# Patient Record
Sex: MALE | Race: WHITE | NOT HISPANIC OR LATINO | Employment: FULL TIME | URBAN - METROPOLITAN AREA
[De-identification: names, ages, dates, MRNs, and addresses within clinical notes are randomized per-mention and may not be internally consistent; named-entity substitution may affect disease eponyms.]

---

## 2020-02-14 NOTE — PROGRESS NOTES
"SUBJECTIVE:   CC: Armaan Agustin is an 54 year old male who presents for preventative health visit.     Healthy Habits:     Getting at least 3 servings of Calcium per day:  NO    Bi-annual eye exam:  Yes    Dental care twice a year:  Yes    Sleep apnea or symptoms of sleep apnea:  None    Diet:  Regular (no restrictions)    Frequency of exercise:  6-7 days/week    Duration of exercise:  15-30 minutes    Taking medications regularly:  Yes    Medication side effects:  None    PHQ-2 Total Score: 0    Additional concerns today:  No    Patient does have additional concerns including reflux which is improved with Tums.  States he was previously given a referral for GI.  Symptoms have improved over the last couple years after starting to use Tums.  Occasionally feels like there is something caught in his throat, location not very concerned at this time.  Does describe some worsening of pain after caffeinated beverages such as Coke or Pepsi.    Additionally patient would like a skin tag removed in his left groin for cosmetic purposes.  He refers to it as his \"third testicle \".    Patient does report having a normal colonoscopy approximately 5 years ago.  I have recommended he bring in records for us to review or call and let us know where this was done so we can obtain records.    He is not fasting today.    Today's PHQ-2 Score:   PHQ-2 ( 1999 [employer]) 2/20/2020   Q1: Little interest or pleasure in doing things 0   Q2: Feeling down, depressed or hopeless 0   PHQ-2 Score 0   Q1: Little interest or pleasure in doing things Not at all   Q2: Feeling down, depressed or hopeless Not at all   PHQ-2 Score 0     Abuse: Current or Past(Physical, Sexual or Emotional)- No  Do you feel safe in your environment? Yes    Social History     Tobacco Use     Smoking status: Never Smoker     Smokeless tobacco: Never Used   Substance Use Topics     Alcohol use: Yes     Comment: 3 drinks per week     If you drink alcohol do you typically have " >3 drinks per day or >7 drinks per week? No    Alcohol Use 2/20/2020   Prescreen: >3 drinks/day or >7 drinks/week? No   Prescreen: >3 drinks/day or >7 drinks/week? -     Last PSA: No results found for: PSA - Declined screening after informed decision making.    Reviewed orders with patient. Reviewed health maintenance and updated orders accordingly - Yes    There is no problem list on file for this patient.    Past Surgical History:   Procedure Laterality Date     C RECONSTRUCT SCAPHOID CARPAL W PROSTHESIS         Social History     Tobacco Use     Smoking status: Never Smoker     Smokeless tobacco: Never Used   Substance Use Topics     Alcohol use: Yes     Comment: 3 drinks per week     Family History   Problem Relation Age of Onset     Cancer Father          No current outpatient medications on file.     No Known Allergies    Reviewed and updated as needed this visit by clinical staff  Tobacco  Allergies  Meds  Med Hx  Surg Hx  Fam Hx  Soc Hx      Reviewed and updated as needed this visit by Provider  Tobacco  Allergies  Med Hx  Surg Hx  Fam Hx  Soc Hx       History reviewed. No pertinent past medical history.   Past Surgical History:   Procedure Laterality Date     C RECONSTRUCT SCAPHOID CARPAL W PROSTHESIS       Review of Systems   Constitutional: Negative for chills and fever.   HENT: Negative for congestion, ear pain, hearing loss and sore throat.    Eyes: Positive for visual disturbance. Negative for pain.   Respiratory: Negative for cough and shortness of breath.    Cardiovascular: Negative for chest pain, palpitations and peripheral edema.   Gastrointestinal: Negative for abdominal pain, constipation, diarrhea, heartburn, hematochezia and nausea.   Genitourinary: Negative for discharge, dysuria, frequency, genital sores, hematuria, impotence and urgency.   Musculoskeletal: Negative for arthralgias, joint swelling and myalgias.   Skin: Negative for rash.   Neurological: Negative for dizziness,  "weakness, headaches and paresthesias.   Psychiatric/Behavioral: Negative for mood changes. The patient is not nervous/anxious.      OBJECTIVE:   /80   Pulse 70   Temp 98.5  F (36.9  C) (Temporal)   Resp 20   Ht 1.683 m (5' 6.25\")   Wt 74.4 kg (164 lb)   SpO2 96%   BMI 26.27 kg/m      Physical Exam  GENERAL: healthy, alert and no distress  EYES: Eyes grossly normal to inspection, PERRL and conjunctivae and sclerae normal  HENT: ear canals and TM's normal, nose and mouth without ulcers or lesions  NECK: no adenopathy, no asymmetry, masses, or scars and thyroid normal to palpation  RESP: lungs clear to auscultation - no rales, rhonchi or wheezes  CV: regular rate and rhythm, normal S1 S2, no S3 or S4, no murmur, click or rub, no peripheral edema and peripheral pulses strong  ABDOMEN: soft, nontender, no hepatosplenomegaly, no masses and bowel sounds normal   (male): Large skin tag, approximately 3 cm in diameter. Appearance in the left inguinal region.  Otherwise normal male genitalia without lesions or urethral discharge, no hernia  RECTAL: normal sphincter tone, no rectal masses, prostate normal size, smooth, nontender without nodules or masses  MS: no gross musculoskeletal defects noted, no edema  SKIN: no suspicious lesions or rashes  NEURO: Normal strength and tone, mentation intact and speech normal  PSYCH: mentation appears normal, affect normal/bright    Diagnostic Test Results:  Labs pending, see orders.     ASSESSMENT/PLAN:   1. Routine general medical examination at a health care facility: General screenings done.  Patient declines prostate cancer screening at this time.  States he previously had a colonoscopy done approximately 5 years ago and now will await him to find out what facility this was done out so we can obtain records.  States it was normal which would put him at 10-year follow-up but require records for verification.  Discussed helmet use, seatbelt use, lifejacket use, sunscreen " "use.  Check general screening labs as below.    COUNSELING:   Reviewed preventive health counseling, as reflected in patient instructions       Regular exercise       Healthy diet/nutrition       Vision screening       Hearing screening       Immunizations    Declined: Influenza and Zoster due to Conscientious objector           Alcohol Use       HIV screeninx in teen years, 1x in adult years, and at intervals if high risk       Colon cancer screening       Prostate cancer screening    2. Special screening for malignant neoplasms, colon: Patient will get his records or call with facility name for us to obtain records for his colonoscopy.    3. Screening for thyroid disorder:   - TSH with free T4 reflex; Future    4. Screening cholesterol level: Patient not fasting today.  Will return for fasting labs.  - Lipid panel reflex to direct LDL Fasting; Future    5. Screening for diabetes mellitus: Patient not fasting today.  Will return for fasting labs.  - GLUCOSE; Future    6. Skin tag: Given size and desire for good cosmetic appearance, recommend removal by general surgery or dermatology.  Referral placed.  - GENERAL SURG ADULT REFERRAL; Future    7. Overweight (BMI 25.0-29.9):  Estimated body mass index is 26.27 kg/m  as calculated from the following:    Height as of this encounter: 1.683 m (5' 6.25\").    Weight as of this encounter: 74.4 kg (164 lb).     Weight management plan: Discussed healthy diet and exercise guidelines    8. Gastroesophageal reflux disease, esophagitis presence not specified: Recommend conservative therapy as symptoms are days 2 weeks apart.  He can use Pepcid as needed.  Avoid caffeinated beverages, Pasta sauce, fatty foods, mint, alcohol, chocolate, etc.       reports that he has never smoked. He has never used smokeless tobacco.      Counseling Resources:  ATP IV Guidelines  Pooled Cohorts Equation Calculator  FRAX Risk Assessment  ICSI Preventive Guidelines  Dietary Guidelines for " Americans, 2010  USDA's MyPlate  ASA Prophylaxis  Lung CA Screening    Newton Hopson MD  [facility]     This chart is completed utilizing dictation software; typos and/or incorrect word substitutions may unintentionally occur.

## 2020-02-20 ENCOUNTER — OFFICE VISIT (OUTPATIENT)
Dept: FAMILY MEDICINE | Facility: CLINIC | Age: 55
End: 2020-02-20
Payer: COMMERCIAL

## 2020-02-20 ENCOUNTER — TELEPHONE (OUTPATIENT)
Dept: SURGERY | Facility: CLINIC | Age: 55
End: 2020-02-20

## 2020-02-20 VITALS
BODY MASS INDEX: 26.36 KG/M2 | SYSTOLIC BLOOD PRESSURE: 110 MMHG | HEIGHT: 66 IN | WEIGHT: 164 LBS | HEART RATE: 70 BPM | RESPIRATION RATE: 20 BRPM | OXYGEN SATURATION: 96 % | TEMPERATURE: 98.5 F | DIASTOLIC BLOOD PRESSURE: 80 MMHG

## 2020-02-20 DIAGNOSIS — Z12.11 SPECIAL SCREENING FOR MALIGNANT NEOPLASMS, COLON: ICD-10-CM

## 2020-02-20 DIAGNOSIS — Z13.29 SCREENING FOR THYROID DISORDER: ICD-10-CM

## 2020-02-20 DIAGNOSIS — Z00.00 ROUTINE GENERAL MEDICAL EXAMINATION AT A HEALTH CARE FACILITY: Primary | ICD-10-CM

## 2020-02-20 DIAGNOSIS — Z13.220 SCREENING CHOLESTEROL LEVEL: ICD-10-CM

## 2020-02-20 DIAGNOSIS — L91.8 SKIN TAG: ICD-10-CM

## 2020-02-20 DIAGNOSIS — E66.3 OVERWEIGHT (BMI 25.0-29.9): ICD-10-CM

## 2020-02-20 DIAGNOSIS — Z13.1 SCREENING FOR DIABETES MELLITUS: ICD-10-CM

## 2020-02-20 DIAGNOSIS — K21.9 GASTROESOPHAGEAL REFLUX DISEASE, ESOPHAGITIS PRESENCE NOT SPECIFIED: ICD-10-CM

## 2020-02-20 PROCEDURE — 99213 OFFICE O/P EST LOW 20 MIN: CPT | Mod: 25 | Performed by: FAMILY MEDICINE

## 2020-02-20 PROCEDURE — 99386 PREV VISIT NEW AGE 40-64: CPT | Performed by: FAMILY MEDICINE

## 2020-02-20 ASSESSMENT — ENCOUNTER SYMPTOMS
SHORTNESS OF BREATH: 0
NERVOUS/ANXIOUS: 0
HEMATOCHEZIA: 0
EYE PAIN: 0
SORE THROAT: 0
HEMATURIA: 0
CONSTIPATION: 0
WEAKNESS: 0
HEARTBURN: 0
COUGH: 0
HEADACHES: 0
ARTHRALGIAS: 0
MYALGIAS: 0
NAUSEA: 0
JOINT SWELLING: 0
DYSURIA: 0
FEVER: 0
ABDOMINAL PAIN: 0
DIARRHEA: 0
FREQUENCY: 0
PALPITATIONS: 0
CHILLS: 0
PARESTHESIAS: 0
DIZZINESS: 0

## 2020-02-20 ASSESSMENT — MIFFLIN-ST. JEOR: SCORE: 1535.62

## 2020-02-20 ASSESSMENT — PAIN SCALES - GENERAL: PAINLEVEL: NO PAIN (0)

## 2020-02-20 NOTE — TELEPHONE ENCOUNTER
[facility] Call Center    Phone Message    May a detailed message be left on voicemail: yes     Reason for Call: Other: patient has an order to see general surgery for Skin Tag, referring provider clinic would like to know if provider would see pt for dx of Skin Tag. please advise     Action Taken: Message routed to:  Adult Clinics: Surgery, General p [phone]

## 2020-02-21 NOTE — TELEPHONE ENCOUNTER
"Pt called, to gather more information. No answer. VM id's pt. Left message for pt to call the clinic back at [phone]....Kristin Triana RN        Skin tag: Given size and desire for good cosmetic appearance, recommend removal by general surgery or dermatology.  Referral placed.  - GENERAL SURG ADULT REFERRAL; Future     7. Overweight (BMI 25.0-29.9):  Estimated body mass index is 26.27 kg/m  as calculated from the following:    Height as of this encounter: 1.683 m (5' 6.25\").    Weight as of this encounter: 74.4 kg (164 lb).     Weight management plan: Discussed healthy diet and exercise guidelines     8. Gastroesophageal reflux disease, esophagitis presence not specified: Recommend conservative therapy as symptoms are days 2 weeks apart.  He can use Pepcid as needed.  Avoid caffeinated beverages, Pasta sauce, fatty foods, mint, alcohol, chocolate, etc.         reports that he has never smoked. He has never used smokeless tobacco.        Counseling Resources:  ATP IV Guidelines  Pooled Cohorts Equation Calculator  FRAX Risk Assessment  ICSI Preventive Guidelines  Dietary Guidelines for Americans, 2010  USDA's MyPlate  ASA Prophylaxis  Lung CA Screening     Newton Hopson MD  [facility]      This chart is completed utilizing dictation software; typos and/or incorrect word substitutions may unintentionally occur.        Other Notes     "

## 2020-02-24 NOTE — TELEPHONE ENCOUNTER
Called patient and informed him that the general surgeon that we have here in [place] does not deal with skin tags.  I informed him that is a Dermatology issue.  I told him to get in contact with the person that referred him and have him put in a dermatology referral.  I scheduled him with Dr. Hardin on 3/13/2020.    Glenys Teran CMA

## 2020-07-06 ENCOUNTER — TELEPHONE (OUTPATIENT)
Dept: FAMILY MEDICINE | Facility: CLINIC | Age: 55
End: 2020-07-06

## 2020-07-06 DIAGNOSIS — R22.9 SUBCUTANEOUS MASS: Primary | ICD-10-CM

## 2020-07-06 NOTE — TELEPHONE ENCOUNTER
"Reason for Call: Request for an order or referral:    Order or referral being requested: General Surgery Referral (Possibly Dermatology Referral)    Date needed: as soon as possible    Has the patient been seen by the PCP for this problem? YES    Additional comments: Patient was seen 2/20/20 with Dr Davion Garcia for a large skin tag. Patient was referred to general surgery for removal. This note was placed by the [facility]: \"Called patient and informed him that the general surgeon that we have here in [place] does not deal with skin tags.  I informed him that is a Dermatology issue.  I told him to get in contact with the person that referred him and have him put in a dermatology referral.  I scheduled him with Dr. Hardin on 3/13/2020.  Glenys Tearn CMA\" . Patient's wife called in (no C2C on file) and requested another referral be placed or that the doctor reach out to the surgery clinic and explain the situation because now there is a lump in the patient's leg under the skin tag and they do not believe Dermatology can take care of the lump and skin tag. Please advise. Patient works 2nd shift so it would be best to reach out to him between 10am and 1pm. [phone]    Phone number Patient can be reached at:  Home number on file [phone] (home)    Best Time:  10am -1pm     Can we leave a detailed message on this number?  YES    Call taken on 7/6/2020 at 2:29 PM by Crista Anderson    "

## 2020-07-07 NOTE — TELEPHONE ENCOUNTER
Called patient. No answer. Left message. Will try back later.    Newton Hopson MD  [facility]

## 2020-07-08 NOTE — TELEPHONE ENCOUNTER
Called patient again. Got voicemail which is full and can not leave message.    I will try again later. A surgical referral has been placed. Please call and help schedule. If they have further questions I can reach out again.    Newton Hopson MD  [facility]

## 2020-07-08 NOTE — TELEPHONE ENCOUNTER
Pt called back relayed message and currently no questions at this time and he is currently getting it scheduled thank you

## 2020-09-02 ENCOUNTER — OFFICE VISIT (OUTPATIENT)
Dept: SURGERY | Facility: CLINIC | Age: 55
End: 2020-09-02
Attending: FAMILY MEDICINE
Payer: COMMERCIAL

## 2020-09-02 VITALS
SYSTOLIC BLOOD PRESSURE: 133 MMHG | BODY MASS INDEX: 26.27 KG/M2 | WEIGHT: 164 LBS | HEART RATE: 71 BPM | DIASTOLIC BLOOD PRESSURE: 85 MMHG

## 2020-09-02 DIAGNOSIS — R22.9 SUBCUTANEOUS MASS: ICD-10-CM

## 2020-09-02 PROCEDURE — 99243 OFF/OP CNSLTJ NEW/EST LOW 30: CPT | Performed by: SURGERY

## 2020-09-02 NOTE — LETTER
9/2/2020         RE: Armaan Agustin  [id number] [address] [address]        Dear Colleague,    Thank you for referring your patient, Armaan Agustin, to the [facility]. Please see a copy of my visit note below.    Patient seen in consultation for large skin tag by Newton Hopson    HPI:  Patient is a 55 year old male  with complaints of enlarging skin tag left groin  The patient noticed the symptoms about 2-3 years ago.    Started small and now has gotten to be about the size of a grape  There might be a lump below the skin in the area also  No painful, no bleeding  nothing makes the episode better.  Patient has not family history of skin cancer problems. Father had colon cancer    Review Of Systems    Skin: as above. Has some moles that he wouldn't mind having removed  Ears/Nose/Throat: negative  Respiratory: No shortness of breath, dyspnea on exertion, cough, or hemoptysis  Cardiovascular: negative  Gastrointestinal: negative  Genitourinary: negative  Musculoskeletal: negative  Neurologic: negative  Hematologic/Lymphatic/Immunologic: negative  Endocrine: negative      Past Medical History:   Diagnosis Date     NO ACTIVE PROBLEMS        Past Surgical History:   Procedure Laterality Date     C RECONSTRUCT SCAPHOID CARPAL W PROSTHESIS Right      FINGER SURGERY Left 1982    index       Social History     Socioeconomic History     Marital status:      Spouse name: Not on file     Number of children: Not on file     Years of education: Not on file     Highest education level: Not on file   Occupational History     Not on file   Social Needs     Financial resource strain: Not on file     Food insecurity     Worry: Not on file     Inability: Not on file     Transportation needs     Medical: Not on file     Non-medical: Not on file   Tobacco Use     Smoking status: Never Smoker     Smokeless tobacco: Never Used   Substance and Sexual Activity     Alcohol use: Yes     Comment: 3  drinks per week     Drug use: Never     Sexual activity: Yes     Partners: Female   Lifestyle     Physical activity     Days per week: Not on file     Minutes per session: Not on file     Stress: Not on file   Relationships     Social connections     Talks on phone: Not on file     Gets together: Not on file     Attends Holiness service: Not on file     Active member of club or organization: Not on file     Attends meetings of clubs or organizations: Not on file     Relationship status: Not on file     Intimate partner violence     Fear of current or ex partner: Not on file     Emotionally abused: Not on file     Physically abused: Not on file     Forced sexual activity: Not on file   Other Topics Concern     Not on file   Social History Narrative     Not on file       No current outpatient medications on file.       Medications and history reviewed    Physical exam:  Vitals: /85   Pulse 71   Wt 74.4 kg (164 lb)   BMI 26.27 kg/m    BMI= Body mass index is 26.27 kg/m .    Constitutional: healthy, alert and no distress  Head: Normocephalic. No masses, lesions, tenderness or abnormalities  Cardiovascular: negative, PMI normal. No lifts, heaves, or thrills. RRR. No murmurs, clicks gallops or rub  Respiratory: negative, Percussion normal. Good diaphragmatic excursion. Lungs clear  Gastrointestinal: Abdomen soft, non-tender. BS normal. No masses, organomegaly  : Normal external genitalia without lesions and see skin below  Musculoskeletal: extremities normal- no gross deformities noted, gait normal and normal muscle tone  Skin: left groin with large pedicled/polypoid skin tag about 2.5 cm diameter, stalk itself perhaps a cm or less. No other skin abnormalities, no ulceration or bleeding. Not tender.  There are a few moles he had me look at 1 also in left groin area and a few on his back. These all appear as regular nevi no concerning features.  Psychiatric: mentation appears normal and affect  normal/bright  Patient able to get up on table without difficulty.      Assessment:     ICD-10-CM    1. Subcutaneous mass  R22.9 GENERAL SURG ADULT REFERRAL     Plan: With this being on a stalk as it is should be able to be resected with a fairly small incision.  We will plan to do this in clinic under local.  Patient agreeable.  We will work at scheduling a time for the procedure    Contreras Hernández MD      Again, thank you for allowing me to participate in the care of your patient.        Sincerely,        Contreras Hernández MD

## 2020-09-02 NOTE — PROGRESS NOTES
Patient seen in consultation for large skin tag by Newton Hopson    HPI:  Patient is a 55 year old male  with complaints of enlarging skin tag left groin  The patient noticed the symptoms about 2-3 years ago.    Started small and now has gotten to be about the size of a grape  There might be a lump below the skin in the area also  No painful, no bleeding  nothing makes the episode better.  Patient has not family history of skin cancer problems. Father had colon cancer    Review Of Systems    Skin: as above. Has some moles that he wouldn't mind having removed  Ears/Nose/Throat: negative  Respiratory: No shortness of breath, dyspnea on exertion, cough, or hemoptysis  Cardiovascular: negative  Gastrointestinal: negative  Genitourinary: negative  Musculoskeletal: negative  Neurologic: negative  Hematologic/Lymphatic/Immunologic: negative  Endocrine: negative      Past Medical History:   Diagnosis Date     NO ACTIVE PROBLEMS        Past Surgical History:   Procedure Laterality Date     C RECONSTRUCT SCAPHOID CARPAL W PROSTHESIS Right      FINGER SURGERY Left 1982    index       Social History     Socioeconomic History     Marital status:      Spouse name: Not on file     Number of children: Not on file     Years of education: Not on file     Highest education level: Not on file   Occupational History     Not on file   Social Needs     Financial resource strain: Not on file     Food insecurity     Worry: Not on file     Inability: Not on file     Transportation needs     Medical: Not on file     Non-medical: Not on file   Tobacco Use     Smoking status: Never Smoker     Smokeless tobacco: Never Used   Substance and Sexual Activity     Alcohol use: Yes     Comment: 3 drinks per week     Drug use: Never     Sexual activity: Yes     Partners: Female   Lifestyle     Physical activity     Days per week: Not on file     Minutes per session: Not on file     Stress: Not on file   Relationships     Social connections      Talks on phone: Not on file     Gets together: Not on file     Attends Hoahaoism service: Not on file     Active member of club or organization: Not on file     Attends meetings of clubs or organizations: Not on file     Relationship status: Not on file     Intimate partner violence     Fear of current or ex partner: Not on file     Emotionally abused: Not on file     Physically abused: Not on file     Forced sexual activity: Not on file   Other Topics Concern     Not on file   Social History Narrative     Not on file       No current outpatient medications on file.       Medications and history reviewed    Physical exam:  Vitals: /85   Pulse 71   Wt 74.4 kg (164 lb)   BMI 26.27 kg/m    BMI= Body mass index is 26.27 kg/m .    Constitutional: healthy, alert and no distress  Head: Normocephalic. No masses, lesions, tenderness or abnormalities  Cardiovascular: negative, PMI normal. No lifts, heaves, or thrills. RRR. No murmurs, clicks gallops or rub  Respiratory: negative, Percussion normal. Good diaphragmatic excursion. Lungs clear  Gastrointestinal: Abdomen soft, non-tender. BS normal. No masses, organomegaly  : Normal external genitalia without lesions and see skin below  Musculoskeletal: extremities normal- no gross deformities noted, gait normal and normal muscle tone  Skin: left groin with large pedicled/polypoid skin tag about 2.5 cm diameter, stalk itself perhaps a cm or less. No other skin abnormalities, no ulceration or bleeding. Not tender.  There are a few moles he had me look at 1 also in left groin area and a few on his back. These all appear as regular nevi no concerning features.  Psychiatric: mentation appears normal and affect normal/bright  Patient able to get up on table without difficulty.      Assessment:     ICD-10-CM    1. Subcutaneous mass  R22.9 GENERAL SURG ADULT REFERRAL     Plan: With this being on a stalk as it is should be able to be resected with a fairly small incision.   We will plan to do this in clinic under local.  Patient agreeable.  We will work at scheduling a time for the procedure    Contreras Hernández MD

## 2020-09-16 ENCOUNTER — OFFICE VISIT (OUTPATIENT)
Dept: SURGERY | Facility: CLINIC | Age: 55
End: 2020-09-16
Payer: COMMERCIAL

## 2020-09-16 VITALS
DIASTOLIC BLOOD PRESSURE: 83 MMHG | WEIGHT: 164 LBS | SYSTOLIC BLOOD PRESSURE: 133 MMHG | BODY MASS INDEX: 26.36 KG/M2 | HEART RATE: 78 BPM | HEIGHT: 66 IN

## 2020-09-16 DIAGNOSIS — L91.8 SKIN TAG: ICD-10-CM

## 2020-09-16 DIAGNOSIS — L98.8 POLYP OF SKIN: Primary | ICD-10-CM

## 2020-09-16 PROCEDURE — 11403 EXC TR-EXT B9+MARG 2.1-3CM: CPT | Mod: 59 | Performed by: SURGERY

## 2020-09-16 PROCEDURE — 11200 RMVL SKIN TAGS UP TO&INC 15: CPT | Mod: 51 | Performed by: SURGERY

## 2020-09-16 PROCEDURE — 88305 TISSUE EXAM BY PATHOLOGIST: CPT | Performed by: SURGERY

## 2020-09-16 PROCEDURE — 88304 TISSUE EXAM BY PATHOLOGIST: CPT | Performed by: SURGERY

## 2020-09-16 ASSESSMENT — MIFFLIN-ST. JEOR: SCORE: 1526.65

## 2020-09-16 NOTE — LETTER
9/16/2020         RE: Armaan Agustin  [address] [address] [place] [id number]        Dear Colleague,    Thank you for referring your patient, Armaan Agustin, to the [facility]. Please see a copy of my visit note below.    PROCEDURE:   Written consent was obtained  The left groin area was prepped and draped. I first anesthetized the larger polypoid lesion with 1% lidocaine with epinephrine. This was just lateral to the scrotum but did not arise from scrotal skin.  I used a scalpel to amputate lesion at the base of the stalk.  This measured 3 x 2.3 cm on removal. Closure was accomplished with interrupted 4-0 vicryl for the dermal layer of the skin to help approximate the skin edges.  Incision was covered with skin glue.    I then anesthetized the smaller lesion which is more superior in the left groin and appeared as likely a skin tag.  I used forceps to elevate the lesion and then amputated it with the scalpel.  A single 4-0 Vicryl was used to help approximate the skin edges.  The wound was then dressed with skin glue.    The procedure was well tolerated and without complications. Specimen was sent to Pathology.        Again, thank you for allowing me to participate in the care of your patient.        Sincerely,        Contreras Hernández MD

## 2020-09-16 NOTE — PROGRESS NOTES
PROCEDURE:   Written consent was obtained  The left groin area was prepped and draped. I first anesthetized the larger polypoid lesion with 1% lidocaine with epinephrine. This was just lateral to the scrotum but did not arise from scrotal skin.  I used a scalpel to amputate lesion at the base of the stalk.  This measured 3 x 2.3 cm on removal. Closure was accomplished with interrupted 4-0 vicryl for the dermal layer of the skin to help approximate the skin edges.  Incision was covered with skin glue.    I then anesthetized the smaller lesion which is more superior in the left groin and appeared as likely a skin tag.  I used forceps to elevate the lesion and then amputated it with the scalpel.  A single 4-0 Vicryl was used to help approximate the skin edges.  The wound was then dressed with skin glue.    The procedure was well tolerated and without complications. Specimen was sent to Pathology.

## 2020-09-18 LAB — COPATH REPORT: NORMAL

## 2020-12-21 ENCOUNTER — OFFICE VISIT (OUTPATIENT)
Dept: FAMILY MEDICINE | Facility: CLINIC | Age: 55
End: 2020-12-21
Payer: COMMERCIAL

## 2020-12-21 VITALS
HEIGHT: 68 IN | BODY MASS INDEX: 24.4 KG/M2 | TEMPERATURE: 98.3 F | SYSTOLIC BLOOD PRESSURE: 122 MMHG | WEIGHT: 161 LBS | RESPIRATION RATE: 20 BRPM | DIASTOLIC BLOOD PRESSURE: 80 MMHG | HEART RATE: 88 BPM | OXYGEN SATURATION: 99 %

## 2020-12-21 DIAGNOSIS — Z48.02: Primary | ICD-10-CM

## 2020-12-21 DIAGNOSIS — S01.01XD LACERATION OF SCALP, SUBSEQUENT ENCOUNTER: ICD-10-CM

## 2020-12-21 PROCEDURE — 99213 OFFICE O/P EST LOW 20 MIN: CPT | Performed by: NURSE PRACTITIONER

## 2020-12-21 ASSESSMENT — MIFFLIN-ST. JEOR: SCORE: 1536.85

## 2020-12-21 NOTE — PATIENT INSTRUCTIONS
"  Patient Education     Stitches or Staple Removal  You were seen today for removal of your stitches or staples. Your wound is healing as expected. The wound has healed well enough that the stitches or staples can be removed. The wound will continue to heal for the next few months.   At this time there is no sign of infection.   Home care    If you have pain, take pain medicine as advised by your healthcare provider.     Keep your wound clean and protected by covering it with a bandage for the next week or so.     Wash your hands with soap and warm water before and after caring for your wound. This helps prevent infection.    Clean the wound gently with soap and clean, running water daily or as directed by your healthcare provider. Don't use iodine, alcohol, or other cleansers on the wound.  Gently pat it dry. Put on a new bandage, if needed. Don't reuse bandages.    If the area gets wet, gently pat it dry with a clean cloth. Replace the wet bandage with a dry one.    Check the wound daily for signs of infection. (These are listed under \"When to seek medical advice\" below.)    You may shower and bathe as usual. Swimming may be allowed, but check with your healthcare provider first.    Keep the wound out of prolonged direct sunlight. The new skin is very sensitive and can easily sunburn causing worse scarring.    Ask your provider about using over-the-counter scar removing creams if your wound is highly visible.    Follow-up care  Follow up with your healthcare provider as advised.  When to seek medical advice   Call your healthcare provider if any of the following occur:    Wound reopens or bleeds    Signs of an infection, such as:  ? Increasing redness or swelling around the wound  ? Increased warmth from the wound  ? Worsening pain  ? Red streaking lines away from the wound  ? Fluid draining from the wound    Fever of 100.4 F (38 C) or higher, or as directed by your healthcare provider  [employer] last reviewed this " educational content on 4/1/2018 2000-2020 [employer]. [address] [place] [address], [place] [address]. All rights reserved. This information is not intended as a substitute for professional medical care. Always follow your healthcare professional's instructions.

## 2020-12-21 NOTE — PROGRESS NOTES
"Subjective     Armaan Agustin is a 55 year old male who presents to clinic today for the following health issues:    HPI       Patient here for Staple Removal from right side of his head.  Patient states that he injured the top of his scalp due to blunt trauma (hitting on cabinet) he was seen in urgent care 10 days ago and had 5 staples placed.  Patient denies pain, erythema, and inflammation, or bruising.           Review of Systems   Constitutional, HEENT, cardiovascular, pulmonary, GI, , musculoskeletal, neuro, skin, endocrine and psych systems are negative, except as otherwise noted.      Objective    /80 (BP Location: Left arm, Patient Position: Chair, Cuff Size: Adult Regular)   Pulse 88   Temp 98.3  F (36.8  C) (Temporal)   Resp 20   Ht 1.715 m (5' 7.5\")   Wt 73 kg (161 lb)   SpO2 99%   BMI 24.84 kg/m    Body mass index is 24.84 kg/m .  Physical Exam   GENERAL: healthy, alert and no distress  SKIN: Laceration on top of scalp proximately 2 inches in length closed with 5 staples after obtaining an informed consent staples were removed successfully without incident mild area of bleeding occurred that stopped after pressure was held.  Neosporin was applied.      Assessment & Plan     Encounter for removal of staples      Laceration of scalp, subsequent encounter    Home care instructions were reviewed with the patient. The risks, benefits and treatment options of prescribed medications or other treatments have been discussed with the patient. The patient verbalized their understanding and should call or follow up if no improvement or if they develop further problems.            Patient Instructions     Patient Education     Stitches or Staple Removal  You were seen today for removal of your stitches or staples. Your wound is healing as expected. The wound has healed well enough that the stitches or staples can be removed. The wound will continue to heal for the next few months.   At this time " "there is no sign of infection.   Home care    If you have pain, take pain medicine as advised by your healthcare provider.     Keep your wound clean and protected by covering it with a bandage for the next week or so.     Wash your hands with soap and warm water before and after caring for your wound. This helps prevent infection.    Clean the wound gently with soap and clean, running water daily or as directed by your healthcare provider. Don't use iodine, alcohol, or other cleansers on the wound.  Gently pat it dry. Put on a new bandage, if needed. Don't reuse bandages.    If the area gets wet, gently pat it dry with a clean cloth. Replace the wet bandage with a dry one.    Check the wound daily for signs of infection. (These are listed under \"When to seek medical advice\" below.)    You may shower and bathe as usual. Swimming may be allowed, but check with your healthcare provider first.    Keep the wound out of prolonged direct sunlight. The new skin is very sensitive and can easily sunburn causing worse scarring.    Ask your provider about using over-the-counter scar removing creams if your wound is highly visible.    Follow-up care  Follow up with your healthcare provider as advised.  When to seek medical advice   Call your healthcare provider if any of the following occur:    Wound reopens or bleeds    Signs of an infection, such as:  ? Increasing redness or swelling around the wound  ? Increased warmth from the wound  ? Worsening pain  ? Red streaking lines away from the wound  ? Fluid draining from the wound    Fever of 100.4 F (38 C) or higher, or as directed by your healthcare provider  [employer] last reviewed this educational content on 4/1/2018 2000-2020 [employer]. [address] [place] [address]. All rights reserved. This information is not intended as a substitute for professional medical care. Always follow your healthcare professional's instructions.               No " follow-ups on file.    Melyssa Haas, MONIKA CNP  [facility]

## 2021-01-04 ENCOUNTER — HEALTH MAINTENANCE LETTER (OUTPATIENT)
Age: 56
End: 2021-01-04

## 2021-04-01 ENCOUNTER — TELEPHONE (OUTPATIENT)
Dept: FAMILY MEDICINE | Facility: CLINIC | Age: 56
End: 2021-04-01

## 2021-04-01 NOTE — TELEPHONE ENCOUNTER
Patient Quality Outreach Summary      Summary:    Patient is due/failing the following:   Colonoscopy and Physical     Type of outreach:    Phone, spoke to patient/parent. patient would like a call next week to schedule.     Questions for provider review:    None                                                                                                                    Manju Cat CMA       Chart routed to Care Team.

## 2021-04-25 ENCOUNTER — HEALTH MAINTENANCE LETTER (OUTPATIENT)
Age: 56
End: 2021-04-25

## 2021-04-27 NOTE — TELEPHONE ENCOUNTER
Spoke to patient and scheduled.     Next 5 appointments (look out 90 days)    Apr 30, 2021  8:00 AM  PHYSICAL with MONIKA Bowen CNP  [facility] ([facility] ) [address], [address]  [facility] [id number] [phone]          Manju Cat, [facility]

## 2021-10-10 ENCOUNTER — HEALTH MAINTENANCE LETTER (OUTPATIENT)
Age: 56
End: 2021-10-10

## 2022-05-22 ENCOUNTER — HEALTH MAINTENANCE LETTER (OUTPATIENT)
Age: 57
End: 2022-05-22

## 2022-09-18 ENCOUNTER — HEALTH MAINTENANCE LETTER (OUTPATIENT)
Age: 57
End: 2022-09-18

## 2022-11-01 ENCOUNTER — NURSE TRIAGE (OUTPATIENT)
Dept: PEDIATRICS | Facility: CLINIC | Age: 57
End: 2022-11-01

## 2022-11-01 NOTE — TELEPHONE ENCOUNTER
"S-(situation): Patient and patient's wife calling regarding difficulty breathing symptoms.     B-(background): Patient began cold symptoms last Wednesday, noticed wheezing/SOB this past Friday. Patient has taken home COVID tests, negative.     A-(assessment): Patient experiencing wheezing and SOB. Denies SOB at rest, states SOB has been constant since yesterday. Patient current 02 = 90%, HR = 76bpm. Earlier in phone conversation, 02 = 88%. Per spouse, breathing seems more labored with any physical exertion, \"walking from couch to kitchen, he gets out of breath\". Patient feels symptoms are getting progressively worse. Patient not sleeping, \"throat gets plugged up with mucus, unable to sleep\". No dizziness. Patient notes fatigue. Sometimes has runny nose. Cough, occasionally productive, light yellow phlegm.     R-(recommendations): Per protocol and 02 readings, RN advised ED. Patient verbalized understanding and agreed with plan.     Reason for Disposition    Oxygen level (e.g., pulse oximetry) 90 percent or lower    Additional Information    Negative: MODERATE difficulty breathing (e.g., speaks in phrases, SOB even at rest, pulse 100-120) of new-onset or worse than normal    Negative: Chest pain    Negative: Wheezing (high pitched whistling sound) and previous asthma attacks or use of asthma medicines    Negative: Difficulty breathing and within 14 days of COVID-19 Exposure    Negative: Difficulty breathing and only present when coughing    Negative: Difficulty breathing and only from stuffy nose    Negative: Difficulty breathing and only from stuffy nose or runny nose from common cold    Negative: SEVERE difficulty breathing (e.g., struggling for each breath, speaks in single words, pulse > 120)    Negative: Breathing stopped and hasn't returned    Negative: Choking on something    Negative: Bluish (or gray) lips or face    Negative: Difficult to awaken or acting confused (e.g., disoriented, slurred speech)    " "Negative: Passed out (i.e., fainted, collapsed and was not responding)    Negative: Wheezing started suddenly after medicine, an allergic food, or bee sting    Negative: Stridor    Negative: Slow, shallow and weak breathing    Negative: Sounds like a life-threatening emergency to the triager    Answer Assessment - Initial Assessment Questions  1. RESPIRATORY STATUS: \"Describe your breathing?\" (e.g., wheezing, shortness of breath, unable to speak, severe coughing)       SOB. Wheezing.   2. ONSET: \"When did this breathing problem begin?\"       started Friday   3. PATTERN \"Does the difficult breathing come and go, or has it been constant since it started?\"       Started to be constant yesterday.   4. SEVERITY: \"How bad is your breathing?\" (e.g., mild, moderate, severe)     - MILD: No SOB at rest, mild SOB with walking, speaks normally in sentences, can lie down, no retractions, pulse < 100.     - MODERATE: SOB at rest, SOB with minimal exertion and prefers to sit, cannot lie down flat, speaks in phrases, mild retractions, audible wheezing, pulse 100-120.     - SEVERE: Very SOB at rest, speaks in single words, struggling to breathe, sitting hunched forward, retractions, pulse > 120       Mild-moderate, does not feel SOB at rest, throat gets plugged up with mucus when laying down, unable to sleep.   5. RECURRENT SYMPTOM: \"Have you had difficulty breathing before?\" If Yes, ask: \"When was the last time?\" and \"What happened that time?\"       No   6. CARDIAC HISTORY: \"Do you have any history of heart disease?\" (e.g., heart attack, angina, bypass surgery, angioplasty)       no  7. LUNG HISTORY: \"Do you have any history of lung disease?\"  (e.g., pulmonary embolus, asthma, emphysema)      no  8. CAUSE: \"What do you think is causing the breathing problem?\"       Unsure   9. OTHER SYMPTOMS: \"Do you have any other symptoms? (e.g., dizziness, runny nose, cough, chest pain, fever)      No dizziness. Fatigue. Sometimes has runny " "nose. Cough, occasionally productive, light yellow phlegm.   10. O2 SATURATION MONITOR:  \"Do you use an oxygen saturation monitor (pulse oximeter) at home?\" If Yes, \"What is your reading (oxygen level) today?\" \"What is your usual oxygen saturation reading?\" (e.g., 95%)        88%   11. PREGNANCY: \"Is there any chance you are pregnant?\" \"When was your last menstrual period?\"        NA  12. TRAVEL: \"Have you traveled out of the country in the last month?\" (e.g., travel history, exposures)        No    Protocols used: BREATHING DIFFICULTY-A-OH    Melvin JOYCE RN 11/1/2022 at 8:22 AM    "

## 2022-11-09 ENCOUNTER — OFFICE VISIT (OUTPATIENT)
Dept: FAMILY MEDICINE | Facility: CLINIC | Age: 57
End: 2022-11-09
Payer: COMMERCIAL

## 2022-11-09 VITALS
SYSTOLIC BLOOD PRESSURE: 125 MMHG | OXYGEN SATURATION: 94 % | WEIGHT: 146.5 LBS | HEART RATE: 92 BPM | TEMPERATURE: 98.9 F | DIASTOLIC BLOOD PRESSURE: 86 MMHG | HEIGHT: 66 IN | BODY MASS INDEX: 23.54 KG/M2

## 2022-11-09 DIAGNOSIS — Z28.21 COVID-19 VACCINATION DECLINED: ICD-10-CM

## 2022-11-09 DIAGNOSIS — Z00.00 ROUTINE PHYSICAL EXAMINATION: Primary | ICD-10-CM

## 2022-11-09 DIAGNOSIS — Z87.891 PERSONAL HISTORY OF TOBACCO USE, PRESENTING HAZARDS TO HEALTH: ICD-10-CM

## 2022-11-09 DIAGNOSIS — Z09 HOSPITAL DISCHARGE FOLLOW-UP: ICD-10-CM

## 2022-11-09 DIAGNOSIS — Z11.3 SCREEN FOR STD (SEXUALLY TRANSMITTED DISEASE): ICD-10-CM

## 2022-11-09 DIAGNOSIS — Z12.11 SPECIAL SCREENING FOR MALIGNANT NEOPLASMS, COLON: ICD-10-CM

## 2022-11-09 DIAGNOSIS — Z13.1 SCREENING FOR DIABETES MELLITUS: ICD-10-CM

## 2022-11-09 DIAGNOSIS — Z13.220 LIPID SCREENING: ICD-10-CM

## 2022-11-09 DIAGNOSIS — R13.10 DYSPHAGIA, UNSPECIFIED TYPE: ICD-10-CM

## 2022-11-09 DIAGNOSIS — Z13.0 SCREENING, ANEMIA, DEFICIENCY, IRON: ICD-10-CM

## 2022-11-09 DIAGNOSIS — Z12.5 SCREENING FOR PROSTATE CANCER: ICD-10-CM

## 2022-11-09 DIAGNOSIS — Z28.21 INFLUENZA VACCINATION DECLINED: ICD-10-CM

## 2022-11-09 DIAGNOSIS — D72.829 LEUKOCYTOSIS, UNSPECIFIED TYPE: Primary | ICD-10-CM

## 2022-11-09 DIAGNOSIS — J96.01 ACUTE RESPIRATORY FAILURE WITH HYPOXIA (H): ICD-10-CM

## 2022-11-09 LAB
ALBUMIN SERPL-MCNC: 3.9 G/DL (ref 3.4–5)
ALP SERPL-CCNC: 53 U/L (ref 40–150)
ALT SERPL W P-5'-P-CCNC: 45 U/L (ref 0–70)
ANION GAP SERPL CALCULATED.3IONS-SCNC: 6 MMOL/L (ref 3–14)
AST SERPL W P-5'-P-CCNC: 22 U/L (ref 0–45)
BILIRUB SERPL-MCNC: 0.3 MG/DL (ref 0.2–1.3)
BUN SERPL-MCNC: 20 MG/DL (ref 7–30)
CALCIUM SERPL-MCNC: 9.5 MG/DL (ref 8.5–10.1)
CHLORIDE BLD-SCNC: 100 MMOL/L (ref 94–109)
CHOLEST SERPL-MCNC: 245 MG/DL
CO2 SERPL-SCNC: 31 MMOL/L (ref 20–32)
CREAT SERPL-MCNC: 0.8 MG/DL (ref 0.66–1.25)
ERYTHROCYTE [DISTWIDTH] IN BLOOD BY AUTOMATED COUNT: 11.8 % (ref 10–15)
FASTING STATUS PATIENT QL REPORTED: YES
GFR SERPL CREATININE-BSD FRML MDRD: >90 ML/MIN/1.73M2
GLUCOSE BLD-MCNC: 100 MG/DL (ref 70–99)
HCT VFR BLD AUTO: 46.5 % (ref 40–53)
HDLC SERPL-MCNC: 69 MG/DL
HGB BLD-MCNC: 15.6 G/DL (ref 13.3–17.7)
HIV 1+2 AB+HIV1 P24 AG SERPL QL IA: NONREACTIVE
LDLC SERPL CALC-MCNC: 143 MG/DL
MCH RBC QN AUTO: 32.2 PG (ref 26.5–33)
MCHC RBC AUTO-ENTMCNC: 33.5 G/DL (ref 31.5–36.5)
MCV RBC AUTO: 96 FL (ref 78–100)
NONHDLC SERPL-MCNC: 176 MG/DL
PLATELET # BLD AUTO: 324 10E3/UL (ref 150–450)
POTASSIUM BLD-SCNC: 4 MMOL/L (ref 3.4–5.3)
PROT SERPL-MCNC: 7.7 G/DL (ref 6.8–8.8)
PSA SERPL-MCNC: 0.6 UG/L (ref 0–4)
RBC # BLD AUTO: 4.85 10E6/UL (ref 4.4–5.9)
SODIUM SERPL-SCNC: 137 MMOL/L (ref 133–144)
T PALLIDUM AB SER QL: NONREACTIVE
TRIGL SERPL-MCNC: 166 MG/DL
WBC # BLD AUTO: 11.4 10E3/UL (ref 4–11)

## 2022-11-09 PROCEDURE — 87591 N.GONORRHOEAE DNA AMP PROB: CPT | Performed by: FAMILY MEDICINE

## 2022-11-09 PROCEDURE — 80053 COMPREHEN METABOLIC PANEL: CPT | Performed by: FAMILY MEDICINE

## 2022-11-09 PROCEDURE — G0103 PSA SCREENING: HCPCS | Performed by: FAMILY MEDICINE

## 2022-11-09 PROCEDURE — 99496 TRANSJ CARE MGMT HIGH F2F 7D: CPT | Performed by: FAMILY MEDICINE

## 2022-11-09 PROCEDURE — 80061 LIPID PANEL: CPT | Performed by: FAMILY MEDICINE

## 2022-11-09 PROCEDURE — 87389 HIV-1 AG W/HIV-1&-2 AB AG IA: CPT | Performed by: FAMILY MEDICINE

## 2022-11-09 PROCEDURE — 85025 COMPLETE CBC W/AUTO DIFF WBC: CPT | Performed by: FAMILY MEDICINE

## 2022-11-09 PROCEDURE — 99396 PREV VISIT EST AGE 40-64: CPT | Mod: 25 | Performed by: FAMILY MEDICINE

## 2022-11-09 PROCEDURE — 86780 TREPONEMA PALLIDUM: CPT | Performed by: FAMILY MEDICINE

## 2022-11-09 PROCEDURE — 87491 CHLMYD TRACH DNA AMP PROBE: CPT | Performed by: FAMILY MEDICINE

## 2022-11-09 PROCEDURE — 36415 COLL VENOUS BLD VENIPUNCTURE: CPT | Performed by: FAMILY MEDICINE

## 2022-11-09 RX ORDER — DOXYCYCLINE 100 MG/1
100 CAPSULE ORAL 2 TIMES DAILY
Qty: 6 CAPSULE | Refills: 0
Start: 2022-11-09 | End: 2022-11-12

## 2022-11-09 RX ORDER — CEFDINIR 300 MG/1
300 CAPSULE ORAL 2 TIMES DAILY
Qty: 6 CAPSULE | Refills: 0
Start: 2022-11-09 | End: 2022-12-07

## 2022-11-09 RX ORDER — UMECLIDINIUM BROMIDE AND VILANTEROL TRIFENATATE 62.5; 25 UG/1; UG/1
1 POWDER RESPIRATORY (INHALATION) DAILY
Qty: 30 EACH | Refills: 1
Start: 2022-11-09 | End: 2023-04-19

## 2022-11-09 RX ORDER — PANTOPRAZOLE SODIUM 40 MG/1
40 TABLET, DELAYED RELEASE ORAL DAILY
Qty: 60 TABLET | Refills: 1
Start: 2022-11-09 | End: 2022-12-07

## 2022-11-09 ASSESSMENT — ENCOUNTER SYMPTOMS
SHORTNESS OF BREATH: 1
COUGH: 1
NERVOUS/ANXIOUS: 1

## 2022-11-09 ASSESSMENT — PATIENT HEALTH QUESTIONNAIRE - PHQ9
SUM OF ALL RESPONSES TO PHQ QUESTIONS 1-9: 8
SUM OF ALL RESPONSES TO PHQ QUESTIONS 1-9: 8
10. IF YOU CHECKED OFF ANY PROBLEMS, HOW DIFFICULT HAVE THESE PROBLEMS MADE IT FOR YOU TO DO YOUR WORK, TAKE CARE OF THINGS AT HOME, OR GET ALONG WITH OTHER PEOPLE: SOMEWHAT DIFFICULT

## 2022-11-09 ASSESSMENT — PAIN SCALES - GENERAL: PAINLEVEL: NO PAIN (0)

## 2022-11-09 NOTE — PROGRESS NOTES
Assessment & Plan   1. Hospital discharge follow-up: Plan outlined as below.  Status improved.  Medications updated.  See separate physical note today.  - General PFT Lab (Please always keep checked); Future  - Pulmonary Function Test; Future  - cefdinir (OMNICEF) 300 MG capsule; Take 1 capsule (300 mg) by mouth 2 times daily  Dispense: 6 capsule; Refill: 0  - doxycycline hyclate (VIBRAMYCIN) 100 MG capsule; Take 1 capsule (100 mg) by mouth 2 times daily for 3 days  Dispense: 6 capsule; Refill: 0  - pantoprazole (PROTONIX) 40 MG EC tablet; Take 1 tablet (40 mg) by mouth daily  Dispense: 60 tablet; Refill: 1  - umeclidinium-vilanterol (ANORO ELLIPTA) 62.5-25 MCG/ACT oral inhaler; Inhale 1 puff into the lungs daily  Dispense: 30 each; Refill: 1    2. Acute respiratory failure with hypoxia (H): Improved.  On home oxygen.  O2 saturations greater than 90% today.  Normal respiratory rate.  Recommend PFTs for work-up for COPD.  Continue on antibiotics as prescribed.  An oral Ellipta inhaler needs to be picked up still.  Follow-up within 1 month.  Sooner if can  prescription.  - General PFT Lab (Please always keep checked); Future  - Pulmonary Function Test; Future  - cefdinir (OMNICEF) 300 MG capsule; Take 1 capsule (300 mg) by mouth 2 times daily  Dispense: 6 capsule; Refill: 0  - doxycycline hyclate (VIBRAMYCIN) 100 MG capsule; Take 1 capsule (100 mg) by mouth 2 times daily for 3 days  Dispense: 6 capsule; Refill: 0  - umeclidinium-vilanterol (ANORO ELLIPTA) 62.5-25 MCG/ACT oral inhaler; Inhale 1 puff into the lungs daily  Dispense: 30 each; Refill: 1    3. Dysphagia, unspecified type: Continue on Protonix.  Well-tolerated.  Plan for EGD and colonoscopy scheduled 1/24/2023.  - pantoprazole (PROTONIX) 40 MG EC tablet; Take 1 tablet (40 mg) by mouth daily  Dispense: 60 tablet; Refill: 1    4. Personal history of tobacco use, presenting hazards to health: Patient reports quitting smoking.  PFTs as above.    5.  COVID-19 vaccination declined    6. Influenza vaccination declined      Return in about 1 year (around 11/9/2023) for Physical Exam.    Newton Hopson MD  [facility]    This chart is completed utilizing dictation software; typos and/or incorrect word substitutions may unintentionally occur.       Yunior Crook is a 57 year old, presenting for the following health issues:  Physical    Hospital Follow-up Visit:    Hospital/Nursing Home/ Rehab Facility: [facility]  Date of Admission: 11/1/22  Date of Discharge: 11/8/22  Reason(s) for Admission: COPD    Was your hospitalization related to COVID-19? No   Problems taking medications regularly:  No meds  Medication changes since discharge: yes, pt has list of new meds  Problems adhering to non-medication therapy:  None    Summary of hospitalization:  CareEverywhere information obtained and reviewed  Diagnostic Tests/Treatments reviewed.  Follow up needed: EGD and PFTs  Other Healthcare Providers Involved in Patient s Care:         None  Update since discharge: improved. Plan of care communicated with patient    Plan to continue current medication regiment including 3 days of doxycycline twice daily and cefdinir twice daily, as well as steroid burst for 5 days of prednisone 40 mg, and Anoro 1 inhalation daily.  Medication list reviewed.    HOSPITAL COURSE:  57 year old male came to the hospital with worsening shortness of breath and coughing. Initially he was on 8L of O2 and complaining of significant dysphagia. He was started on protonix bid and GI did see him. Plans are in place with GI to have an EGD and colonoscopy as an outpatient.  His respiratory status did steadily improve he has been on treatment for community acquired pneumonia - omnicef and doxycycline. Also due to his underlying smoking history and job related chemical exposure it was felt he likely has undiagnosed COPD. Pulmonary was consulted  and have ordered histoplasma, fungal test, alpha1 antitripsin and started him on steroids along with anora inhaler. Many of these tests are currently pending and pulmonary will see in the office to review the results and perform PFT's.    1) Acute Hypoxic Respiratory Failure - likely there is a chronic component due to underlying COPD.   - to finish antibiotic course with doxycyline/omnicef  - given inhaler prescription  - prednisone x5 days  - did do a formal oxygen evaluation does not qualify    2) Dysphagia- per family and patient this has been going on for years  - did mention about holistic medicine instructed only to take medications doctors recommend.   - protonix bid  - outpatient EGD  - due to weight loss and history of colon cancer GI plans to do a colonoscopy at the same time    3) Underlying COPD- pulmonary to make the formal evaluation and diagnosis in the office pending PFT    4) Mild Malnutrition- encourage PO intake    5) Memory Issues - patient appears to have improved - likely at baseline now    6) Anxiety - per family worse now that he is on prednisone  - given script for prn ativan    7) Leukocytosis - likely secondary to prednisone      Today: Patient reports improved respiratory status.  Has picked all of his medications except his inhaler.  He is still waiting on this from the pharmacy.  No side effects.    Also like to complete his physical today.    Ordered histoplasmosis, fungal testing, and alpha-1 antitrypsin have not yet returned.    As part of his physical he is okay with STD screening.  He states he has 3 girlfriends outside of his wife.    Review of Systems   HENT: Positive for congestion.    Respiratory: Positive for cough and shortness of breath.    Genitourinary: Negative for impotence and penile discharge.   Psychiatric/Behavioral: The patient is nervous/anxious.      12 point review of systems otherwise negative except as listed above.    Reviewed past medical history, past  "surgical history, allergies, family history, social history, medication list.        Objective    /86 (BP Location: Left arm, Patient Position: Chair, Cuff Size: Adult Regular)   Pulse 92   Temp 98.9  F (37.2  C) (Temporal)   Ht 1.685 m (5' 6.34\")   Wt 66.5 kg (146 lb 8 oz)   SpO2 94%   BMI 23.40 kg/m    Body mass index is 23.4 kg/m .  Physical Exam   GENERAL: healthy, alert and no distress  EYES: Eyes grossly normal to inspection, PERRL and conjunctivae and sclerae normal  HENT: ear canals and TM's normal, nose and mouth without ulcers or lesions  NECK: no adenopathy, no asymmetry, masses, or scars and thyroid normal to palpation  RESP: lungs clear to auscultation - no rales, rhonchi or wheezes  CV: regular rate and rhythm, normal S1 S2, no S3 or S4, no murmur, click or rub, no peripheral edema and peripheral pulses strong  ABDOMEN: soft, nontender, no hepatosplenomegaly, no masses and bowel sounds normal  MS: no gross musculoskeletal defects noted, no edema  SKIN: no suspicious lesions or rashes  NEURO: Normal strength and tone, mentation intact and speech normal  PSYCH: mentation appears normal, affect normal/bright    Labs: none      Answers for HPI/ROS submitted by the patient on 11/9/2022  If you checked off any problems, how difficult have these problems made it for you to do your work, take care of things at home, or get along with other people?: Somewhat difficult  PHQ9 TOTAL SCORE: 8      "

## 2022-11-09 NOTE — PROGRESS NOTES
SUBJECTIVE:   CC: Armaan is an 57 year old who presents for preventative health visit.     Healthy Habits:     Getting at least 3 servings of Calcium per day:  Yes    Bi-annual eye exam:  NO    Dental care twice a year:  Yes    Sleep apnea or symptoms of sleep apnea:  None    Diet:  Regular (no restrictions)    Frequency of exercise:  1 day/week    Duration of exercise:  15-30 minutes    Taking medications regularly:  Yes    Medication side effects:  None    PHQ-2 Total Score: 2    Additional concerns today:  No    Would like STD screening as he has had 3 partners outside of his wife.    Today's PHQ-2 Score:   PHQ-2 (  [employer]) 2022   Q1: Little interest or pleasure in doing things 1   Q2: Feeling down, depressed or hopeless 1   PHQ-2 Score 2   PHQ-2 Total Score (12-17 Years)- Positive if 3 or more points; Administer PHQ-A if positive -   Q1: Little interest or pleasure in doing things Nearly every day   Q2: Feeling down, depressed or hopeless Several days   PHQ-2 Score 4     Abuse: Current or Past(Physical, Sexual or Emotional)- No  Do you feel safe in your environment? Yes    Have you ever done Advance Care Planning? (For example, a Health Directive, POLST, or a discussion with a medical provider or your loved ones about your wishes): No, advance care planning information given to patient to review.  Patient declined advance care planning discussion at this time.    Social History     Tobacco Use     Smoking status: Former     Packs/day: 0.05     Types: Cigarettes     Start date:      Quit date: 2022     Years since quittin.1     Smokeless tobacco: Never   Substance Use Topics     Alcohol use: Yes     Alcohol/week: 3.0 standard drinks     Types: 3 Glasses of wine per week     Comment: 3 drinks per week       Alcohol Use 2022   Prescreen: >3 drinks/day or >7 drinks/week? No   Prescreen: >3 drinks/day or >7 drinks/week? -     Last PSA: No results found for: PSA    Reviewed orders with  patient. Reviewed health maintenance and updated orders accordingly - Yes  BP Readings from Last 3 Encounters:   22 125/86   20 122/80   20 133/83    Wt Readings from Last 3 Encounters:   22 66.5 kg (146 lb 8 oz)   20 73 kg (161 lb)   20 74.4 kg (164 lb)          Patient Active Problem List   Diagnosis     Acute respiratory failure with hypoxia (H)     Dysphagia, unspecified type     Personal history of tobacco use, presenting hazards to health     Past Surgical History:   Procedure Laterality Date     FINGER SURGERY Left 1982    index     ZZC RECONSTRUCT SCAPHOID CARPAL W PROSTHESIS Right        Social History     Tobacco Use     Smoking status: Former     Packs/day: 0.05     Types: Cigarettes     Start date:      Quit date: 2022     Years since quittin.1     Smokeless tobacco: Never   Substance Use Topics     Alcohol use: Yes     Alcohol/week: 3.0 standard drinks     Types: 3 Glasses of wine per week     Comment: 3 drinks per week     Family History   Problem Relation Age of Onset     Cerebrovascular Disease Mother 90     Cancer Father 64        colon cancer     Heart Disease Father      Chronic Obstructive Pulmonary Disease Brother      Lymphoma Brother      Heart Disease Brother      Breast Cancer No family hx of      Prostate Cancer No family hx of      Celiac Disease No family hx of      Ulcerative Colitis No family hx of      Crohn's Disease No family hx of          Current Outpatient Medications   Medication Sig Dispense Refill     cefdinir (OMNICEF) 300 MG capsule Take 1 capsule (300 mg) by mouth 2 times daily 6 capsule 0     doxycycline hyclate (VIBRAMYCIN) 100 MG capsule Take 1 capsule (100 mg) by mouth 2 times daily for 3 days 6 capsule 0     pantoprazole (PROTONIX) 40 MG EC tablet Take 1 tablet (40 mg) by mouth daily 60 tablet 1     umeclidinium-vilanterol (ANORO ELLIPTA) 62.5-25 MCG/ACT oral inhaler Inhale 1 puff into the lungs daily 30 each 1     No Known  "Allergies    Reviewed and updated as needed this visit by clinical staff   Tobacco  Allergies  Meds  Problems  Med Hx  Surg Hx  Fam Hx          Reviewed and updated as needed this visit by Provider   Tobacco  Allergies  Meds  Problems  Med Hx  Surg Hx  Fam Hx       Social history reviewed.  Past Medical History:   Diagnosis Date     NO ACTIVE PROBLEMS       Past Surgical History:   Procedure Laterality Date     FINGER SURGERY Left 1982    index     ZZC RECONSTRUCT SCAPHOID CARPAL W PROSTHESIS Right        Review of Systems   HENT: Positive for congestion.    Respiratory: Positive for cough and shortness of breath.    Genitourinary: Negative for impotence and penile discharge.   Psychiatric/Behavioral: The patient is nervous/anxious.      12 point review of systems otherwise negative.    OBJECTIVE:   /86 (BP Location: Left arm, Patient Position: Chair, Cuff Size: Adult Regular)   Pulse 92   Temp 98.9  F (37.2  C) (Temporal)   Ht 1.685 m (5' 6.34\")   Wt 66.5 kg (146 lb 8 oz)   SpO2 94%   BMI 23.40 kg/m      Physical Exam  GENERAL: healthy, alert and no distress  EYES: Eyes grossly normal to inspection, PERRL and conjunctivae and sclerae normal  HENT: ear canals and TM's normal, nose and mouth without ulcers or lesions  NECK: no adenopathy, no asymmetry, masses, or scars and thyroid normal to palpation  RESP: lungs clear to auscultation - no rales, rhonchi or wheezes  CV: regular rate and rhythm, normal S1 S2, no S3 or S4, no murmur, click or rub, no peripheral edema and peripheral pulses strong  ABDOMEN: soft, nontender, no hepatosplenomegaly, no masses and bowel sounds normal  MS: no gross musculoskeletal defects noted, no edema  SKIN: no suspicious lesions or rashes  NEURO: Normal strength and tone, mentation intact and speech normal  PSYCH: mentation appears normal, affect normal/bright    Labs: Pending    ASSESSMENT/PLAN:   1. Routine physical examination: Discussed personal health and " "safety. Routine screenings as below. Appropriate anticipatory guidance, vaccinations, and health screening recommendations delivered according to the USPSTF and other appropriate society guidelines.  Patient understands and is agreeable with the plan ordered below.  See separate hospital follow-up note.  - Lipid panel reflex to direct LDL Fasting; Future  - Comprehensive metabolic panel (BMP + Alb, Alk Phos, ALT, AST, Total. Bili, TP); Future  - CBC with platelets; Future  - PSA, screen; Future  - HIV Antigen Antibody Combo; Future  - Treponema Abs w Reflex to RPR and Titer; Future  - Neisseria gonorrhoeae PCR; Future  - Chlamydia trachomatis PCR; Future  - PRIMARY CARE FOLLOW-UP SCHEDULING; Future    2. Lipid screening  - Lipid panel reflex to direct LDL Fasting; Future    3. Screening for diabetes mellitus  - Comprehensive metabolic panel (BMP + Alb, Alk Phos, ALT, AST, Total. Bili, TP); Future    4. Screening, anemia, deficiency, iron  - CBC with platelets; Future    5. Screening for prostate cancer  - PSA, screen    6. Special screening for malignant neoplasms, colon: Upcoming colonoscopy scheduled 1/24/2023    7. Screen for STD (sexually transmitted disease): Not monogamous.  Check for STDs.  - HIV Antigen Antibody Combo  - Treponema Abs w Reflex to RPR and Titer  - Neisseria gonorrhoeae PCR  - Chlamydia trachomatis PCR    COUNSELING:   Reviewed preventive health counseling, as reflected in patient instructions       Regular exercise       Healthy diet/nutrition       Vision screening       Hearing screening       Safe sex practices/STD prevention       Colorectal cancer screening       Prostate cancer screening    Estimated body mass index is 23.4 kg/m  as calculated from the following:    Height as of this encounter: 1.685 m (5' 6.34\").    Weight as of this encounter: 66.5 kg (146 lb 8 oz).     He reports that he quit smoking about 2 months ago. His smoking use included cigarettes. He started smoking about 4 " years ago. He smoked an average of .05 packs per day. He has never used smokeless tobacco.    Counseling Resources:  ATP IV Guidelines  Pooled Cohorts Equation Calculator  FRAX Risk Assessment  ICSI Preventive Guidelines  Dietary Guidelines for Americans, 2010  USDA's MyPlate  ASA Prophylaxis  Lung CA Screening    Newton Hopson MD  [facility]    Disclaimer: This note consists of symbols derived from keyboarding, dictation and/or voice recognition software. As a result, there may be errors in the script that have gone undetected. Please consider this when interpreting information found in this chart    Answers for HPI/ROS submitted by the patient on 11/9/2022  If you checked off any problems, how difficult have these problems made it for you to do your work, take care of things at home, or get along with other people?: Somewhat difficult  PHQ9 TOTAL SCORE: 8

## 2022-11-10 LAB
BASOPHILS # BLD AUTO: 0 10E3/UL (ref 0–0.2)
BASOPHILS NFR BLD AUTO: 0 %
C TRACH DNA SPEC QL NAA+PROBE: NEGATIVE
EOSINOPHIL # BLD AUTO: 0.1 10E3/UL (ref 0–0.7)
EOSINOPHIL NFR BLD AUTO: 0 %
IMM GRANULOCYTES # BLD: 0 10E3/UL
IMM GRANULOCYTES NFR BLD: 0 %
LYMPHOCYTES # BLD AUTO: 1.2 10E3/UL (ref 0.8–5.3)
LYMPHOCYTES NFR BLD AUTO: 10 %
MONOCYTES # BLD AUTO: 1.1 10E3/UL (ref 0–1.3)
MONOCYTES NFR BLD AUTO: 9 %
N GONORRHOEA DNA SPEC QL NAA+PROBE: NEGATIVE
NEUTROPHILS # BLD AUTO: 9.3 10E3/UL (ref 1.6–8.3)
NEUTROPHILS NFR BLD AUTO: 80 %
WBC # BLD AUTO: 11.7 10E3/UL (ref 4–11)

## 2022-11-10 NOTE — RESULT ENCOUNTER NOTE
Please inform of results if patient has not viewed in [employer] within 3 business days.    Your STD screening labs were normal.     Please call the clinic with any questions you may have.     Have a great day,    Dr. Ricardo

## 2022-11-10 NOTE — RESULT ENCOUNTER NOTE
"Please inform of results if patient has not viewed in [employer] within 3 business days.    CBC Results - Your white blood cell count was high likely from the steroid use and/or the infection they are treating. Your other cell counts were normal.    Your syphilis test was normal.    Your HV test was normal.    PSA - Your Prostate cancer screening lab results were normal.    CMP Results - Your blood sugar was 100. Your kidney function, electrolytes, and liver function were normal.    Lipids - Your \"bad\" cholesterol was elevated. This can be improved with diet and exercise. All animal based foods such as meat, dairy, and eggs contain cholesterol. All plant based foods such as fruits, nuts, and vegetables do not.    You do not need a medication for this at this time.    Continue with the plan of care we discussed for the lung function tests, using the inhaler, and completing your steroid and antibiotic course. Also continue with the plan for the upper and lower camera studies and seeing the pulmonologist.    Please call the clinic with any questions you may have.     Have a great day,    Dr. Ricardo    The 10-year ASCVD risk score (Jeffy ADRIAN, et al., 2019) is: 5.6%    Values used to calculate the score:      Age: 57 years      Sex: Male      Is Non- : No      Diabetic: No      Tobacco smoker: No      Systolic Blood Pressure: 125 mmHg      Is BP treated: No      HDL Cholesterol: 69 mg/dL      Total Cholesterol: 245 mg/dL"

## 2022-11-29 ENCOUNTER — TRANSFERRED RECORDS (OUTPATIENT)
Dept: HEALTH INFORMATION MANAGEMENT | Facility: CLINIC | Age: 57
End: 2022-11-29

## 2022-12-06 ENCOUNTER — TELEPHONE (OUTPATIENT)
Dept: FAMILY MEDICINE | Facility: CLINIC | Age: 57
End: 2022-12-06

## 2022-12-06 NOTE — TELEPHONE ENCOUNTER
FYI    SITUATION:   Patient wife was transferred to ask if the patient could be seen sooner than 12/15/22.     BACKGROUND:   Patient was hospitalized on 11/01/22 for Acute Hypoxemic Respiratory Failure. He was seen at  after that and his O2 was hanging around 86-87. Was prescribed a nebulizer (ALbuterol) and now his O2 is hanging around 91-91. Patient was at work when wife called RN unable to obtain any other information.     HOME TREATMENTS:  Nebulizer  Inhaler    NURSE RECOMMENDATION:   Advised if his O2 levels drop into the 80s, then he needs to go back to the ER. As the clinic does not have the equipment to treat and we would end up calling an ambulance.     PLAN:     Next 5 appointments (look out 90 days)    Dec 07, 2022  3:30 PM  (Arrive by 3:10 PM)  Provider Visit with Newton Hopson MD  [facility] ([facility] ) [address]  [place] [address]  [phone]            PHUC Ram, RN, PHN  [place]  December 6, 2022

## 2022-12-07 ENCOUNTER — TELEPHONE (OUTPATIENT)
Dept: FAMILY MEDICINE | Facility: CLINIC | Age: 57
End: 2022-12-07

## 2022-12-07 ENCOUNTER — OFFICE VISIT (OUTPATIENT)
Dept: FAMILY MEDICINE | Facility: CLINIC | Age: 57
End: 2022-12-07
Payer: COMMERCIAL

## 2022-12-07 VITALS
WEIGHT: 150.5 LBS | HEIGHT: 67 IN | OXYGEN SATURATION: 91 % | DIASTOLIC BLOOD PRESSURE: 76 MMHG | BODY MASS INDEX: 23.62 KG/M2 | SYSTOLIC BLOOD PRESSURE: 117 MMHG | HEART RATE: 90 BPM | TEMPERATURE: 98.4 F

## 2022-12-07 DIAGNOSIS — R41.840 DIFFICULTY CONCENTRATING: ICD-10-CM

## 2022-12-07 DIAGNOSIS — F41.9 ANXIETY: ICD-10-CM

## 2022-12-07 DIAGNOSIS — Z12.11 SCREEN FOR COLON CANCER: ICD-10-CM

## 2022-12-07 DIAGNOSIS — J96.00 ACUTE RESPIRATORY FAILURE, UNSPECIFIED WHETHER WITH HYPOXIA OR HYPERCAPNIA (H): Primary | ICD-10-CM

## 2022-12-07 DIAGNOSIS — K21.00 GASTROESOPHAGEAL REFLUX DISEASE WITH ESOPHAGITIS, UNSPECIFIED WHETHER HEMORRHAGE: ICD-10-CM

## 2022-12-07 PROCEDURE — 99214 OFFICE O/P EST MOD 30 MIN: CPT | Performed by: FAMILY MEDICINE

## 2022-12-07 RX ORDER — IPRATROPIUM BROMIDE AND ALBUTEROL SULFATE 2.5; .5 MG/3ML; MG/3ML
1 SOLUTION RESPIRATORY (INHALATION) EVERY 6 HOURS PRN
Qty: 90 ML | Refills: 0 | Status: SHIPPED | OUTPATIENT
Start: 2022-12-07 | End: 2022-12-28

## 2022-12-07 RX ORDER — HYDROXYZINE HYDROCHLORIDE 25 MG/1
12.5-25 TABLET, FILM COATED ORAL 3 TIMES DAILY PRN
COMMUNITY
Start: 2022-12-07 | End: 2022-12-28

## 2022-12-07 RX ORDER — SUCRALFATE 1 G/1
1 TABLET ORAL 4 TIMES DAILY
Qty: 240 TABLET | Refills: 0 | Status: SHIPPED | OUTPATIENT
Start: 2022-12-07 | End: 2023-04-19

## 2022-12-07 RX ORDER — OMEPRAZOLE 40 MG/1
40 CAPSULE, DELAYED RELEASE ORAL DAILY
COMMUNITY
Start: 2022-12-07 | End: 2023-06-23

## 2022-12-07 ASSESSMENT — ENCOUNTER SYMPTOMS
COUGH: 1
SHORTNESS OF BREATH: 1
HEADACHES: 1
WHEEZING: 1
CHILLS: 1
SORE THROAT: 1
RHINORRHEA: 1

## 2022-12-07 ASSESSMENT — PAIN SCALES - GENERAL: PAINLEVEL: MILD PAIN (2)

## 2022-12-07 NOTE — PROGRESS NOTES
Assessment & Plan   1. Acute respiratory failure, unspecified whether with hypoxia or hypercapnia (H): Oxygen maintained at 91% today.  Add duo nebs.  Continue prednisone taper.  Follow-up with pulmonology as scheduled for PFTs and further recommendations.  Has Anoro Ellipta inhaler.  Consider addition of inhaled corticosteroid but currently is on prednisone.  - ipratropium - albuterol 0.5 mg/2.5 mg/3 mL (DUONEB) 0.5-2.5 (3) MG/3ML neb solution; Take 1 vial (3 mLs) by nebulization every 6 hours as needed for shortness of breath / dyspnea or wheezing  Dispense: 90 mL; Refill: 0    2. Screen for colon cancer: Upcoming colonoscopy but will need to have his breathing under control prior to performing procedure.    3. Gastroesophageal reflux disease with esophagitis, unspecified whether hemorrhage: Continue PPI.  Obtain [facility] records.  Carafate added.  May be worsening exacerbations for problem 1 above.  - sucralfate (CARAFATE) 1 GM tablet; Take 1 tablet (1 g) by mouth 4 times daily  Dispense: 240 tablet; Refill: 0    4. Difficulty concentrating: Discussed going through neuropsych for work-up.  Would avoid stimulants at this time as it could worsen his anxiety and suppress his appetite further.  - Adult Neuropsychology Referral; Future    5. Anxiety: Added to medication list.  Prescribed by urgent care.  - hydrOXYzine (ATARAX) 25 MG tablet; Take 0.5-1 tablets (12.5-25 mg) by mouth 3 times daily as needed for anxiety      Newton Hopson MD  [facility]    This chart is completed utilizing dictation software; typos and/or incorrect word substitutions may unintentionally occur.       Yunior Crook is a 57 year old accompanied by his spouse, presenting for the following health issues:  Hospital F/U (Multiple trips to ER and Urgent Care, Respiratory issues)    History of Present Illness       Reason for visit:  Respiratory issues  Symptom onset:  More than a  month  Symptoms include:  Pneumonia type symptoms/severe acid reflux  Symptom intensity:  Severe  Symptom progression:  Staying the same  Had these symptoms before:  Yes  Has tried/received treatment for these symptoms:  No  What makes it worse:  Eating. laying down/reclining  What makes it better:  Sometimes the meds work    He eats 0-1 servings of fruits and vegetables daily.He consumes 0 sweetened beverage(s) daily.He exercises with enough effort to increase his heart rate 9 or less minutes per day.  He exercises with enough effort to increase his heart rate 3 or less days per week.   He is taking medications regularly.  Cough  This is a new problem. The current episode started more than 1 week ago. The problem has not changed since onset.The cough is productive of sputum. There has been no fever. Associated symptoms include chills, headaches, rhinorrhea, sore throat, shortness of breath and wheezing. He has tried decongestants (multiple medications) for the symptoms. The treatment provided moderate relief.      Hospital Follow-up Visit:    Hospital/Nursing Home/ Rehab Facility: [facility]  Date of Admission: 11/1/22  Date of Discharge: 11/8/22  Reason(s) for Admission: Respiratory distress  Was your hospitalization related to COVID-19? No   Problems taking medications regularly:  None  Medication changes since discharge: Yes - please reconcile medications  Problems adhering to non-medication therapy:  None    Summary of hospitalization:  CareEverywhere information obtained and reviewed  Diagnostic Tests/Treatments reviewed.  Follow up needed: Pulmonology   Other Healthcare Providers Involved in Patient s Care:         None  Update since discharge: improved.    Patient was hospitalized at [facility] on 11/1/2022 through 11/8/2022 for acute on chronic hypoxic respiratory failure.  Suspected to have COPD at that time.  Required 8 L of oxygen at the time, was treated for  community-acquired pneumonia with Omnicef and doxycycline, and also concerned about significant dysphagia.  He states he did have follow-up with [facility] for which I do not have records and they performed an endoscopy and colonoscopy.  They stated he had a tortuous colon and were unable to perform the full colonoscopy.  He believes he took 3 biopsies but do not recall hearing any results from these.    He reports only a small number of years of smoking history but believes he had chemical exposures through work.  He was scheduled for outpatient PFTs which she believes are scheduled next week along with pulmonology referral.  He was given 5 days of prednisone and given prescriptions of anoro and albuterol.    He was also placed on Protonix twice daily for reflux.  This was subsequently switched to omeprazole.  They stated they wanted him on this for 2 weeks as monotherapy prior to starting Pepcid.  Again awaiting [facility] without and give her less calories about records.  They are unsure if he is lost to follow-up with them.    Histoplasma and blasto mycoses labs are negative.  RSV, flu, and COVID were negative.  Gram stain negative.  Legionella negative.  I do not yet see results for alpha 1 antitrypsin deficiency through Care Everywhere.    He followed up with me on 11/9/2022.     Subsequent to this he had a additional acute bronchospasm exacerbation for which she sought emergency department care at 11/21/2022.  He was given a taper of prednisone at that time.  He was discharged given room O2 levels were maintained between 91 and 96%.    He then had an urgent care visit performed an x-ray that was negative.  Viral panel was also negative.  Continued on prednisone taper which she continues on.  He has 4 more days of this.  He has scheduled follow-up with pulmonology reported within the next 2 weeks outside of our system.    Today he reports he has to choose between sleeping and eating.  If he eats it exacerbates his  "GERD which she believes is making his breathing worse.    They are also interested in talk about something for his concentration.  He has never been formally diagnosed with ADHD.  He is wondering how about going to get this diagnosis.    4 days left of steroids. Doensn't have duo nebs.    Plan of care communicated with patient and family     Review of Systems   Constitutional: Positive for chills.   HENT: Positive for rhinorrhea and sore throat.    Respiratory: Positive for cough, shortness of breath and wheezing.    Neurological: Positive for headaches.      Constitutional, HEENT, cardiovascular, pulmonary, GI, , musculoskeletal, neuro, skin, endocrine and psych systems are negative, except as otherwise noted.      Objective    /76 (BP Location: Left arm, Patient Position: Sitting, Cuff Size: Adult Regular)   Pulse 90   Temp 98.4  F (36.9  C) (Temporal)   Ht 1.7 m (5' 6.93\")   Wt 68.3 kg (150 lb 8 oz)   SpO2 91%   BMI 23.62 kg/m    Body mass index is 23.62 kg/m .  Physical Exam   GENERAL: healthy, alert and no distress.  Accompanied by wife.  EYES: Eyes grossly normal to inspection, PERRL and conjunctivae and sclerae normal  NECK: no adenopathy, no asymmetry, masses, or scars and thyroid normal to palpation  RESP: lungs clear to auscultation - no rales, rhonchi or wheezes  CV: regular rate and rhythm, normal S1 S2, no S3 or S4, no murmur, click or rub, no peripheral edema and peripheral pulses strong  MS: no gross musculoskeletal defects noted, no edema  SKIN: no suspicious lesions or rashes over exposed surfaces.  NEURO: Normal strength and tone, mentation intact and speech normal  PSYCH: mentation appears normal, affect normal/bright    Labs: None            "

## 2022-12-07 NOTE — TELEPHONE ENCOUNTER
Fax pharmacy message: please clarify directions  sertraline (ZOLOFT) 50 MG tablet 19 tablet 0 12/7/2022 1/13/2023 --   Sig - Route: Take 0.5 tablets (25 mg) by mouth daily for 7 days, THEN 0.5 tablets (25 mg) daily for 30 days. - Oral   Sent to pharmacy as: Sertraline HCl 50 MG Oral Tablet (ZOLOFT)   Class: E-Prescribe   Order: [id number]     [facility]

## 2022-12-12 NOTE — TELEPHONE ENCOUNTER
Patient was seen on 12/7/22.   Demetri Clayton, ADRIELN, RN, PHN  [place]  December 12, 2022

## 2022-12-21 ENCOUNTER — NURSE TRIAGE (OUTPATIENT)
Dept: FAMILY MEDICINE | Facility: CLINIC | Age: 57
End: 2022-12-21

## 2022-12-21 NOTE — TELEPHONE ENCOUNTER
"Nurse Triage SBAR    Is this a 2nd Level Triage? YES, LICENSED PRACTITIONER REVIEW IS REQUIRED    Situation: patient and wife calling in with complaints of continued acid reflux. Patient having shortness of breath, coughing, intermittent wheezing. Wife reports he has a hiatal hernia. Has been seen at [facility] also. He has been on Sulcrafate. Was on Omeprazole then recently changed to famotidine. Patient is not able to sleep due to when laying down chest tightness, shortness of breath and wheezing. Patient just did a neb so is feeling slightly better now. Pulse 84 bpm. Denies irregular or extra beats. O2 93% on room air. Wife and patient want to know if he needs to be seen again or if there is more he can do at home. They are also wondering how big the hiatal hernia is. Patient denies shortness of breath right now after neb. He does feel shortness of breath right now. He said he has reflux that goes up into his esophagus and he is aspirating his stomach acids.     The wife said he really is not doing worse than normal. They are very \"up and down\" with symptom's and how he feels.     Background: history hiatal hernia. Patient having shortness of breath, coughing, wheezing and chest tightness.     Assessment: Advised to be seen by provider today. Patient and wife verbalized understanding but are wondering if provider do want them to come in or if there another plan they can do from home. Advised a message will be sent to provider or covering provider. RN reviewed red flag symptoms with patient and when to see emergency care. Patient and wife agreed and understood.       Protocol Recommended Disposition:   Go To Office Now    Recommendation:   Should patient be seen in clinic today? Please give recommendations. Thank you!      Routed to provider    Does the patient meet one of the following criteria for ADS visit consideration? No    Reason for Disposition    MILD difficulty breathing (e.g., minimal/no SOB at rest, SOB with " "walking, pulse < 100) of new-onset or worse than normal    Additional Information    Negative: SEVERE difficulty breathing (e.g., struggling for each breath, speaks in single words, pulse > 120)    Negative: Breathing stopped and hasn't returned    Negative: Choking on something    Negative: Bluish (or gray) lips or face    Negative: Difficult to awaken or acting confused (e.g., disoriented, slurred speech)    Negative: Passed out (i.e., fainted, collapsed and was not responding)    Negative: Wheezing started suddenly after medicine, an allergic food, or bee sting    Negative: Stridor    Negative: Slow, shallow and weak breathing    Negative: Sounds like a life-threatening emergency to the triager    Negative: Chest pain    Negative: Wheezing (high pitched whistling sound) and previous asthma attacks or use of asthma medicines    Negative: Difficulty breathing and within 14 days of COVID-19 Exposure    Negative: Difficulty breathing and only present when coughing    Negative: Difficulty breathing and only from stuffy nose    Negative: Difficulty breathing and only from stuffy nose or runny nose from common cold    Negative: MODERATE difficulty breathing (e.g., speaks in phrases, SOB even at rest, pulse 100-120) of new-onset or worse than normal    Negative: Oxygen level (e.g., pulse oximetry) 90 percent or lower    Negative: Wheezing can be heard across the room    Negative: Drooling or spitting out saliva (because can't swallow)    Negative: Any history of prior \"blood clot\" in leg or lungs    Negative: Illness requiring prolonged bedrest in past month (e.g., immobilization, long hospital stay)    Negative: Hip or leg fracture (broken bone) in past month (or had cast on leg or ankle in past month)    Negative: Major surgery in the past month    Negative: Long-distance travel in past month (e.g., car, bus, train, plane; with trip lasting 6 or more hours)    Negative: Cancer treatment in past six months (or has " "cancer now)    Negative: Extra heart beats OR irregular heart beating (i.e., \"palpitations\")    Negative: Fever > 103 F (39.4 C)    Negative: Fever > 101 F (38.3 C) and over 60 years of age    Negative: Fever > 100.0 F (37.8 C) and bedridden (e.g., nursing home patient, stroke, chronic illness, recovering from surgery)    Negative: Fever > 100.0 F (37.8 C) and diabetes mellitus or weak immune system (e.g., HIV positive, cancer chemo, splenectomy, organ transplant, chronic steroids)    Negative: Periods where breathing stops and then resumes normally and bedridden (e.g., nursing home patient, CVA)    Negative: Pregnant or postpartum (from 0 to 6 weeks after delivery)    Negative: Patient sounds very sick or weak to the triager    Protocols used: BREATHING DIFFICULTY-A-OH      "

## 2022-12-21 NOTE — TELEPHONE ENCOUNTER
Are they willing to go to ADS or ED?.  Appointment with PCP 12/28, medications have already been optimized, apparently has pulmology appointment scheduled.   Vincent Gomez MD on 12/21/2022 at 9:58 AM

## 2022-12-21 NOTE — TELEPHONE ENCOUNTER
Messaged covering providers asking that they review.    Dimple Oneal, BSN, RN, PHN  Registered Nurse-Clinic Triage  [facility]  12/21/2022 at 9:22 AM

## 2022-12-21 NOTE — TELEPHONE ENCOUNTER
Patient Contact    Attempt # 1    Was call answered?  No.  Unable to leave message.    Dimple Oneal, ADRIELN, RN, PHN  Registered Nurse-Clinic Triage  [facility]  12/21/2022 at 10:39 AM

## 2022-12-22 ENCOUNTER — TRANSFERRED RECORDS (OUTPATIENT)
Dept: HEALTH INFORMATION MANAGEMENT | Facility: CLINIC | Age: 57
End: 2022-12-22

## 2022-12-22 NOTE — TELEPHONE ENCOUNTER
Called and spoke with patient and wife.   Patient seen at [facility] today. Increased omeprazole to be taken twice a day for the next month. If symptoms improve then can decrease back to once a day.   Also was instructed at appointment to take famotidine along with omeprazole for the next few days.    Patient is continuing to utilize duoneb when needed. Last used this morning. Wife wondering if benzonatate should be represcribed for his cough. She states they were told at GI appointment today that coughing can push the stomach acid up further.    Would like to hold off on ADS and office appointment for now and try medication adjustments made today.     Sapphire MORELN, RN  [facility]

## 2022-12-22 NOTE — TELEPHONE ENCOUNTER
"Spoke with Armaan and read back verbatim what Shell FRANK CNP wrote.    Patient asked if the GI Dr wanted to see him again and I did reiterate that Shell Kumar would not be able to prescribe anything without being evaluated.    Patient understood said, \"thank you and I am not going to worry about that right now, I have to go to work.\"    Patient had no further questions or concerns.    Lacey Montague RN  [facility] ~ Registered Nurse  Clinic Triage ~ [place] [facility] [place]  December 22, 2022    "

## 2022-12-28 ENCOUNTER — OFFICE VISIT (OUTPATIENT)
Dept: FAMILY MEDICINE | Facility: CLINIC | Age: 57
End: 2022-12-28
Payer: COMMERCIAL

## 2022-12-28 VITALS
HEIGHT: 67 IN | SYSTOLIC BLOOD PRESSURE: 116 MMHG | BODY MASS INDEX: 23.07 KG/M2 | DIASTOLIC BLOOD PRESSURE: 80 MMHG | HEART RATE: 95 BPM | WEIGHT: 147 LBS | TEMPERATURE: 98.5 F | OXYGEN SATURATION: 94 %

## 2022-12-28 DIAGNOSIS — J45.40 MODERATE PERSISTENT ASTHMA, UNSPECIFIED WHETHER COMPLICATED: Primary | ICD-10-CM

## 2022-12-28 DIAGNOSIS — F41.9 ANXIETY: ICD-10-CM

## 2022-12-28 PROCEDURE — 99214 OFFICE O/P EST MOD 30 MIN: CPT | Performed by: FAMILY MEDICINE

## 2022-12-28 RX ORDER — MONTELUKAST SODIUM 10 MG/1
10 TABLET ORAL AT BEDTIME
Qty: 90 TABLET | Refills: 0 | Status: SHIPPED | OUTPATIENT
Start: 2022-12-28 | End: 2023-03-30

## 2022-12-28 RX ORDER — HYDROXYZINE HYDROCHLORIDE 25 MG/1
12.5-25 TABLET, FILM COATED ORAL 3 TIMES DAILY PRN
Qty: 90 TABLET | Refills: 0 | Status: SHIPPED | OUTPATIENT
Start: 2022-12-28 | End: 2023-01-27

## 2022-12-28 RX ORDER — IPRATROPIUM BROMIDE AND ALBUTEROL SULFATE 2.5; .5 MG/3ML; MG/3ML
1 SOLUTION RESPIRATORY (INHALATION) EVERY 6 HOURS PRN
Qty: 90 ML | Refills: 0 | Status: SHIPPED | OUTPATIENT
Start: 2022-12-28 | End: 2023-01-31

## 2022-12-28 RX ORDER — BENZONATATE 100 MG/1
100 CAPSULE ORAL 3 TIMES DAILY PRN
Qty: 45 CAPSULE | Refills: 0 | Status: SHIPPED | OUTPATIENT
Start: 2022-12-28 | End: 2023-04-19

## 2022-12-28 ASSESSMENT — LIFESTYLE VARIABLES: SMOKING_STATUS: 0

## 2022-12-28 ASSESSMENT — ENCOUNTER SYMPTOMS
HEADACHES: 1
WHEEZING: 1
RHINORRHEA: 1
COUGH: 1
SHORTNESS OF BREATH: 1

## 2022-12-28 ASSESSMENT — PAIN SCALES - GENERAL: PAINLEVEL: MODERATE PAIN (4)

## 2022-12-28 NOTE — PATIENT INSTRUCTIONS
Important Takeaway Points From This Visit:   a zyrtec, Claritin, or allegra. (Block histamine receptors)  Take Singulair once daily. (Stops histamine release)  See me again within once a month.      As always, please call with any questions or concerns. I look forward to seeing you again soon!    Take care,  Dr. Hopson    Your current medication list is printed. Please keep this with you - it is helpful to bring this current list to any other medical appointments. It can also be helpful if you ever go to the emergency room or hospital.    If you had lab testing today we will call you with the results. The phone number we will call with your results is # [phone] (home) . If this is not the best number please call our clinic and change the number.    If you need any refills, please call your pharmacy and they will contact us.    If you have any further concerns or wish to schedule another appointment, please call our office at [phone].    If you have a medical emergency, please call [phone].    Thank you for coming to [facility]!

## 2022-12-28 NOTE — PROGRESS NOTES
Assessment & Plan   1. Moderate persistent asthma, unspecified whether complicated: Worsening.  Reviewed outside records.  Patient with mild tobacco use history with PFTs showing obstructive lung disease with reversibility more consistent with asthma.  He continues on his current Anoro Ellipta inhaler.  May be more beneficial to put him on and inhaled corticosteroid.  Also seems to have allergy triggers from food.  Allergy referral given.  Given elevated eosinophils, and reversibility Singulair may also be helpful if there is allergy component.  Recommend he take this as well as a once daily antihistamine such as Claritin, Zyrtec, Allegra.  Follow-up in 1 month.  - benzonatate (TESSALON) 100 MG capsule; Take 1 capsule (100 mg) by mouth 3 times daily as needed for cough  Dispense: 45 capsule; Refill: 0  - montelukast (SINGULAIR) 10 MG tablet; Take 1 tablet (10 mg) by mouth At Bedtime  Dispense: 90 tablet; Refill: 0  - Adult Allergy/Asthma Referral; Future    2. Anxiety: Refills given.  - hydrOXYzine (ATARAX) 25 MG tablet; Take 0.5-1 tablets (12.5-25 mg) by mouth 3 times daily as needed for itching or anxiety  Dispense: 90 tablet; Refill: 0       Follow-up Visit   Expected date:  Jan 28, 2023 (Approximate)      Follow Up Appointment Details:     Follow-up with whom?: Me    Follow-Up for what?: Chronic Disease f/u    Chronic Disease f/u: Asthma    How?: In Person    Is this an as-needed follow-up?: No                    Newton Hopson MD  [facility]    This chart is completed utilizing dictation software; typos and/or incorrect word substitutions may unintentionally occur.     Yunior Crook is a 57 year old accompanied by his spouse, presenting for the following health issues:  Cough and Breathing Problem    Cough  This is a chronic problem. The current episode started more than 1 week ago. The problem occurs constantly. The problem has been gradually worsening. The  "cough is productive of brown sputum. There has been no fever. Associated symptoms include headaches, rhinorrhea, shortness of breath and wheezing. He has tried decongestants and mist for the symptoms. The treatment provided moderate relief. He is not a smoker.      Patient is here today to follow-up on his breathing.  Reviewed outside [facility] pulmonology notes dated 12/19/2022.    Previously hospitalized 11/1/2022 through 11/8/2022 for acute hypoxic respiratory failure initially thought to be secondary to COPD exacerbated by URI.  Eosinophils are elevated and serum IgE was elevated to greater than 3000.  Sputum cultures were negative as were test for histoplasmosis, blastomycosis, Fungitell.  He was started on neuro prior to discharge.  He was seen back on 11/21/2022) steroid taper.    PFTs were done on 12/19/2022 prior to his pulmonology visit showing mild airway obstruction with bronchodilator response.    Is recommended he continue on his Anoro and as needed albuterol.    He is being seen today as he believes his breathing is possibly worsening after discontinuing the prednisone taper.  He had noticed wheezing yesterday; however, seems improved today.  Was not present during his pulmonology visit on 12/19/2022.    Additionally he has noted runny nose and worsening cough after eating food.  He is following [facility] for significant GERD.  He is currently on twice daily omeprazole.  He is also taking Pepcid twice daily.    Review of Systems   HENT: Positive for rhinorrhea.    Respiratory: Positive for cough, shortness of breath and wheezing.    Neurological: Positive for headaches.        Objective    /80 (BP Location: Left arm, Patient Position: Sitting, Cuff Size: Adult Regular)   Pulse 95   Temp 98.5  F (36.9  C) (Temporal)   Ht 1.7 m (5' 6.93\")   Wt 66.7 kg (147 lb)   SpO2 94%   BMI 23.07 kg/m    Body mass index is 23.07 kg/m .  Physical Exam   General: Appears well and in no acute " distress.  Cardiovascular: Regular rate and rhythm, normal S1 and S2 without murmur. No extra heartsounds or friction rub. Radial pulses present and equal bilaterally.  Respiratory: Lungs clear to auscultation bilaterally. No wheezing or crackles. No prolonged expiration. Symmetrical chest rise.  Musculoskeletal: No gross extremity deformities. No peripheral edema. Normal muscle bulk.    Labs: none

## 2022-12-29 ENCOUNTER — TELEPHONE (OUTPATIENT)
Dept: FAMILY MEDICINE | Facility: CLINIC | Age: 57
End: 2022-12-29

## 2022-12-29 NOTE — TELEPHONE ENCOUNTER
Called and spoke with pharmacy. States a box of duoneb was picked up yesterday.  Updated wife. She will check if patient picked this up. 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 Will call back if needed.    Sapphire MORELN, RN  [facility]

## 2022-12-29 NOTE — TELEPHONE ENCOUNTER
Wife calling stating again that duoneb was never picked up from pharmacy. States they were given albuterol inhaler and not nebulizer.   Wife requesting RN call and sort this through with pharmacy. Informed that prescription was sent on 12/28/22 and pharmacy indicated already it was picked up yesterday (see previous telephone encounter).   Also called and spoke with patient himself. He also indicates he did not receive duoneb and received an inhaler instead.     Spoke with pharmacy, though there isn't much that RN is able to do in clinic at this point. He states wife was there now and they were discussing options for the patient.     Sapphire MORELN, RN  [facility]

## 2022-12-29 NOTE — TELEPHONE ENCOUNTER
Call from patients wife. Patient was in clinic yesterday to see PCP. She states refill of duoneb was to be sent to pharmacy, but states they have prescription for inhaler. Requesting duoneb be sent as discussed.   Medication list indicates duoneb was sent to pharmacy 12/28/22. Called pharmacy to clarify - not open until 9 am. Will try calling back once pharmacy open.     Sapphire MORELN, RN  [facility]

## 2023-01-26 DIAGNOSIS — F41.9 ANXIETY: ICD-10-CM

## 2023-01-27 DIAGNOSIS — J96.01 ACUTE RESPIRATORY FAILURE WITH HYPOXIA (H): Primary | ICD-10-CM

## 2023-01-27 RX ORDER — HYDROXYZINE HYDROCHLORIDE 25 MG/1
12.5-25 TABLET, FILM COATED ORAL 3 TIMES DAILY PRN
Qty: 90 TABLET | Refills: 3 | Status: SHIPPED | OUTPATIENT
Start: 2023-01-27 | End: 2023-04-19

## 2023-01-27 NOTE — TELEPHONE ENCOUNTER
Prescription approved per [facility] Refill Protocol.  Rosalind Bolaños RN on 1/27/2023 at 11:41 AM

## 2023-01-31 DIAGNOSIS — J96.01 ACUTE RESPIRATORY FAILURE WITH HYPOXIA (H): ICD-10-CM

## 2023-01-31 RX ORDER — IPRATROPIUM BROMIDE AND ALBUTEROL SULFATE 2.5; .5 MG/3ML; MG/3ML
1 SOLUTION RESPIRATORY (INHALATION) EVERY 6 HOURS PRN
Qty: 90 ML | Refills: 1 | Status: SHIPPED | OUTPATIENT
Start: 2023-01-31 | End: 2023-04-20

## 2023-01-31 NOTE — TELEPHONE ENCOUNTER
Prescription approved per [facility] Refill Protocol.  Rosalind Bolaños RN on 1/31/2023 at 12:09 PM

## 2023-02-02 RX ORDER — IPRATROPIUM BROMIDE AND ALBUTEROL SULFATE 2.5; .5 MG/3ML; MG/3ML
1 SOLUTION RESPIRATORY (INHALATION) EVERY 6 HOURS PRN
Qty: 90 ML | Refills: 1 | OUTPATIENT
Start: 2023-02-02

## 2023-03-27 DIAGNOSIS — J45.40 MODERATE PERSISTENT ASTHMA, UNSPECIFIED WHETHER COMPLICATED: ICD-10-CM

## 2023-03-30 RX ORDER — MONTELUKAST SODIUM 10 MG/1
10 TABLET ORAL AT BEDTIME
Qty: 90 TABLET | Refills: 0 | Status: SHIPPED | OUTPATIENT
Start: 2023-03-30 | End: 2023-06-06

## 2023-03-30 NOTE — TELEPHONE ENCOUNTER
Pending Prescriptions:                       Disp   Refills    montelukast (SINGULAIR) 10 MG tablet      90 tab*0            Sig: Take 1 tablet (10 mg) by mouth At Bedtime    Medication is being filled for 1 time joe refill only due to:  Patient is due for ACT.   Next 5 appointments (look out 90 days)    Apr 19, 2023  9:30 AM  (Arrive by 9:10 AM)  Provider Visit with Newton Hopson MD  [facility] ([facility] ) [address], [address]  [facility] [address] [phone]            Please call and help schedule.  Thank you!

## 2023-04-18 DIAGNOSIS — J96.01 ACUTE RESPIRATORY FAILURE WITH HYPOXIA (H): ICD-10-CM

## 2023-04-19 ENCOUNTER — OFFICE VISIT (OUTPATIENT)
Dept: FAMILY MEDICINE | Facility: CLINIC | Age: 58
End: 2023-04-19
Payer: COMMERCIAL

## 2023-04-19 VITALS
DIASTOLIC BLOOD PRESSURE: 76 MMHG | OXYGEN SATURATION: 100 % | TEMPERATURE: 97.2 F | HEIGHT: 67 IN | BODY MASS INDEX: 25.11 KG/M2 | SYSTOLIC BLOOD PRESSURE: 122 MMHG | HEART RATE: 71 BPM | RESPIRATION RATE: 16 BRPM | WEIGHT: 160 LBS

## 2023-04-19 DIAGNOSIS — J45.40 MODERATE PERSISTENT ASTHMA, UNSPECIFIED WHETHER COMPLICATED: Primary | ICD-10-CM

## 2023-04-19 DIAGNOSIS — M20.031 SWAN-NECK DEFORMITY OF FINGER OF RIGHT HAND: ICD-10-CM

## 2023-04-19 PROCEDURE — 99214 OFFICE O/P EST MOD 30 MIN: CPT | Performed by: FAMILY MEDICINE

## 2023-04-19 RX ORDER — LEVALBUTEROL TARTRATE 45 UG/1
2 AEROSOL, METERED ORAL EVERY 6 HOURS PRN
Qty: 15 G | Refills: 0 | Status: SHIPPED | OUTPATIENT
Start: 2023-04-19 | End: 2023-06-06

## 2023-04-19 RX ORDER — UMECLIDINIUM BROMIDE AND VILANTEROL TRIFENATATE 62.5; 25 UG/1; UG/1
1 POWDER RESPIRATORY (INHALATION) DAILY
Qty: 30 EACH | Refills: 11 | Status: SHIPPED | OUTPATIENT
Start: 2023-04-19 | End: 2023-11-10

## 2023-04-19 ASSESSMENT — ASTHMA QUESTIONNAIRES
QUESTION_3 LAST FOUR WEEKS HOW OFTEN DID YOUR ASTHMA SYMPTOMS (WHEEZING, COUGHING, SHORTNESS OF BREATH, CHEST TIGHTNESS OR PAIN) WAKE YOU UP AT NIGHT OR EARLIER THAN USUAL IN THE MORNING: TWO OR THREE NIGHTS A WEEK
QUESTION_4 LAST FOUR WEEKS HOW OFTEN HAVE YOU USED YOUR RESCUE INHALER OR NEBULIZER MEDICATION (SUCH AS ALBUTEROL): TWO OR THREE TIMES PER WEEK
ACT_TOTALSCORE: 14
HOSPITALIZATION_OVERNIGHT_LAST_YEAR_TOTAL: ONE
EMERGENCY_ROOM_LAST_YEAR_TOTAL: THREE OR MORE
QUESTION_1 LAST FOUR WEEKS HOW MUCH OF THE TIME DID YOUR ASTHMA KEEP YOU FROM GETTING AS MUCH DONE AT WORK, SCHOOL OR AT HOME: MOST OF THE TIME
QUESTION_5 LAST FOUR WEEKS HOW WOULD YOU RATE YOUR ASTHMA CONTROL: SOMEWHAT CONTROLLED
QUESTION_2 LAST FOUR WEEKS HOW OFTEN HAVE YOU HAD SHORTNESS OF BREATH: ONCE OR TWICE A WEEK
ACT_TOTALSCORE: 14

## 2023-04-19 NOTE — PROGRESS NOTES
Assessment & Plan     1. Moderate persistent asthma, unspecified whether complicated: ACT score 14 today. Lungs sounds clear. Recommend that patient resumes taking Singulair as prescribed. Refilled Levalbuterol inhaler as needed for shortness of breath or wheezing.  Note given to recommend switching departments if able to avoid fumes and dust.  Ultimately patient may need to look for employment.  Also discussed patient should resume taking an oral Ellipta as his symptoms are controlled.  Recheck 1 month.  - levalbuterol (XOPENEX HFA) 45 MCG/ACT inhaler; Inhale 2 puffs into the lungs every 6 hours as needed for shortness of breath or wheezing  Dispense: 15 g; Refill: 0  - umeclidinium-vilanterol (ANORO ELLIPTA) 62.5-25 MCG/ACT oral inhaler; Inhale 1 puff into the lungs daily  Dispense: 30 each; Refill: 11    2. 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  Was using size 5.  Size 6 given; however, slightly large.  Has upcoming Ortho referral.    Newton Hopson MD  [facility]    Disclaimer: This note consists of symbols derived from keyboarding, dictation and/or voice recognition software. As a result, there may be errors in the script that have gone undetected. Please consider this when interpreting information found in this chart.      Yunior Crook is a 57 year old, presenting for the following health issues:  No chief complaint on file.  History of Present Illness       Reason for visit:  Respiratory issueHe consumes 0 sweetened beverage(s) daily.He exercises with enough effort to increase his heart rate 20 to 29 minutes per day.  He exercises with enough effort to increase his heart rate 5 days per week. He is missing 7 dose(s) of medications per week.     Follow up on respiratory problem.     Asthma Follow-Up    Was ACT completed today?  Yes        4/19/2023     9:11 AM   ACT Total Scores   ACT  "TOTAL SCORE (Goal Greater than or Equal to 20) 14   In the past 12 months, how many times did you visit the emergency room for your asthma without being admitted to the hospital? 3   In the past 12 months, how many times were you hospitalized overnight because of your asthma? 1         How many days per week do you miss taking your asthma controller medication?  7    Please describe any recent triggers for your asthma: dust at work    Have you had any Emergency Room Visits, Urgent Care Visits, or Hospital Admissions since your last office visit?  No    Patient is a 57 year old male patient who presents for follow up on respiratory issues. Patient would like a \"disability letter\" stating that he needs to switch jobs. He states that he has been having issues with coughing up phlegm with \"cardboard dust on it .\"     He states that he has been forgetting to take his Singulair. He uses the DuoNeb nebulizer treatment about three times a week which helps him with the coughing.     Patient also needs a new splint for his right middle finger. Was seen in the ER on 04/11/2023 and diagnosed with [place]-neck deformity of the right middle finger, but the splint they provided is too small for his middle finger. Has now been using foam brace.      Review of Systems   Constitutional, HEENT, cardiovascular, pulmonary, gi and gu systems are negative, except as otherwise noted.      Objective    /76   Pulse 71   Temp 97.2  F (36.2  C) (Temporal)   Resp 16   Ht 1.702 m (5' 7\")   Wt 72.6 kg (160 lb)   SpO2 100%   BMI 25.06 kg/m    Body mass index is 25.06 kg/m .     Physical Exam   GENERAL: healthy, alert and no distress  NECK: no adenopathy, no asymmetry, masses, or scars and thyroid normal to palpation  RESP: lungs clear to auscultation - no rales, rhonchi or wheezes  CV: regular rate and rhythm, normal S1 S2, no S3 or S4, no murmur, click or rub, no peripheral edema and peripheral pulses strong  MS: no gross deformities " noted on bilateral upper and lower extremities with intact sensation. Wearing foam brace.

## 2023-04-19 NOTE — LETTER
April 19, 2023    Armaan Agustin  [id number] [address] [place] [id number]        To Whom It May Concern:    Armaan Agustin was seen today, 4/19/2023.      Please consider an internal transfer to a department where he will not be exposed to fumes, or dust.      Sincerely,          Newton Hopson MD

## 2023-04-20 RX ORDER — IPRATROPIUM BROMIDE AND ALBUTEROL SULFATE 2.5; .5 MG/3ML; MG/3ML
1 SOLUTION RESPIRATORY (INHALATION) EVERY 6 HOURS PRN
Qty: 90 ML | Refills: 1 | Status: SHIPPED | OUTPATIENT
Start: 2023-04-20 | End: 2023-05-30

## 2023-04-20 NOTE — TELEPHONE ENCOUNTER
Pt called in asking that this be filled asap, he thought this was done yesterday at his visit. He is out and asthma is flaring up.  Did double check with [facility] , they do have in stock.

## 2023-04-20 NOTE — TELEPHONE ENCOUNTER
"Pending Prescriptions:                       Disp   Refills    ipratropium - albuterol 0.5 mg/2.5 mg/3 mL*90 mL  1        Sig: Take 1 vial (3 mLs) by nebulization every 6 hours as           needed for shortness of breath or wheezing    Routing refill request to provider for review/approval because:  Labs out of range:        4/19/2023     9:11 AM   ACT Total Scores   ACT TOTAL SCORE (Goal Greater than or Equal to 20) 14   In the past 12 months, how many times did you visit the emergency room for your asthma without being admitted to the hospital? 3   In the past 12 months, how many times were you hospitalized overnight because of your asthma? 1   Patient seen yesterday in clinic.     Requested Prescriptions   Pending Prescriptions Disp Refills    ipratropium - albuterol 0.5 mg/2.5 mg/3 mL (DUONEB) 0.5-2.5 (3) MG/3ML neb solution 90 mL 1     Sig: Take 1 vial (3 mLs) by nebulization every 6 hours as needed for shortness of breath or wheezing       Short-Acting Beta Agonist Inhalers Protocol  Passed - 4/20/2023 10:17 AM        Passed - Patient is age 12 or older        Passed - Recent (12 mo) or future (30 days) visit within the authorizing provider's specialty     Patient has had an office visit with the authorizing provider or a provider within the authorizing providers department within the previous 12 mos or has a future within next 30 days. See \"Patient Info\" tab in inbasket, or \"Choose Columns\" in Meds & Orders section of the refill encounter.              Passed - Medication is active on med list       Asthma Nebs Protocol Passed - 4/20/2023 10:17 AM        Passed - Patient is age 4 years or older        Passed - Recent (12 mo) or future (30 days) visit within the authorizing provider's specialty     Patient has had an office visit with the authorizing provider or a provider within the authorizing providers department within the previous 12 mos or has a future within next 30 days. See \"Patient Info\" tab in " "inbasket, or \"Choose Columns\" in Meds & Orders section of the refill encounter.              Passed - Medication is active on med list            "

## 2023-05-25 DIAGNOSIS — J96.01 ACUTE RESPIRATORY FAILURE WITH HYPOXIA (H): ICD-10-CM

## 2023-05-26 NOTE — TELEPHONE ENCOUNTER
"Pending Prescriptions:                       Disp   Refills    ipratropium - albuterol 0.5 mg/2.5 mg/3 mL*90 mL  1        Sig: Take 1 vial (3 mLs) by nebulization every 6 hours as           needed for shortness of breath or wheezing    Routing refill request to provider for review/approval because:  Insurance wants a 90 day supply. Please send 1620mL     PHQ-9 score:        11/9/2022     9:39 AM   PHQ   PHQ-9 Total Score 8   Q9: Thoughts of better off dead/self-harm past 2 weeks Several days   F/U: Thoughts of suicide or self-harm No   F/U: Safety concerns No         Requested Prescriptions   Pending Prescriptions Disp Refills    ipratropium - albuterol 0.5 mg/2.5 mg/3 mL (DUONEB) 0.5-2.5 (3) MG/3ML neb solution 90 mL 1     Sig: Take 1 vial (3 mLs) by nebulization every 6 hours as needed for shortness of breath or wheezing       Short-Acting Beta Agonist Inhalers Protocol  Passed - 5/25/2023  2:19 PM        Passed - Patient is age 12 or older        Passed - Recent (12 mo) or future (30 days) visit within the authorizing provider's specialty     Patient has had an office visit with the authorizing provider or a provider within the authorizing providers department within the previous 12 mos or has a future within next 30 days. See \"Patient Info\" tab in inbasket, or \"Choose Columns\" in Meds & Orders section of the refill encounter.              Passed - Medication is active on med list       Asthma Nebs Protocol Passed - 5/25/2023  2:19 PM        Passed - Patient is age 4 years or older        Passed - Recent (12 mo) or future (30 days) visit within the authorizing provider's specialty     Patient has had an office visit with the authorizing provider or a provider within the authorizing providers department within the previous 12 mos or has a future within next 30 days. See \"Patient Info\" tab in inbasket, or \"Choose Columns\" in Meds & Orders section of the refill encounter.              Passed - Medication is active " on med list

## 2023-05-30 RX ORDER — IPRATROPIUM BROMIDE AND ALBUTEROL SULFATE 2.5; .5 MG/3ML; MG/3ML
1 SOLUTION RESPIRATORY (INHALATION) EVERY 6 HOURS PRN
Qty: 1620 ML | Refills: 1 | Status: SHIPPED | OUTPATIENT
Start: 2023-05-30 | End: 2023-11-10

## 2023-06-06 ENCOUNTER — NURSE TRIAGE (OUTPATIENT)
Dept: FAMILY MEDICINE | Facility: CLINIC | Age: 58
End: 2023-06-06

## 2023-06-06 ENCOUNTER — TELEPHONE (OUTPATIENT)
Dept: FAMILY MEDICINE | Facility: CLINIC | Age: 58
End: 2023-06-06

## 2023-06-06 ENCOUNTER — OFFICE VISIT (OUTPATIENT)
Dept: FAMILY MEDICINE | Facility: CLINIC | Age: 58
End: 2023-06-06
Payer: COMMERCIAL

## 2023-06-06 VITALS
SYSTOLIC BLOOD PRESSURE: 130 MMHG | HEIGHT: 67 IN | WEIGHT: 156 LBS | RESPIRATION RATE: 12 BRPM | HEART RATE: 97 BPM | DIASTOLIC BLOOD PRESSURE: 64 MMHG | TEMPERATURE: 98.1 F | OXYGEN SATURATION: 96 % | BODY MASS INDEX: 24.48 KG/M2

## 2023-06-06 DIAGNOSIS — J45.40 MODERATE PERSISTENT ASTHMA, UNSPECIFIED WHETHER COMPLICATED: ICD-10-CM

## 2023-06-06 PROCEDURE — 99214 OFFICE O/P EST MOD 30 MIN: CPT | Performed by: FAMILY MEDICINE

## 2023-06-06 RX ORDER — PREDNISONE 20 MG/1
40 TABLET ORAL DAILY
Qty: 10 TABLET | Refills: 0 | Status: SHIPPED | OUTPATIENT
Start: 2023-06-06 | End: 2023-06-11

## 2023-06-06 RX ORDER — LEVALBUTEROL TARTRATE 45 UG/1
2 AEROSOL, METERED ORAL EVERY 6 HOURS PRN
Qty: 15 G | Refills: 0 | Status: SHIPPED | OUTPATIENT
Start: 2023-06-06 | End: 2023-06-23

## 2023-06-06 RX ORDER — MONTELUKAST SODIUM 10 MG/1
10 TABLET ORAL AT BEDTIME
Qty: 90 TABLET | Refills: 0 | Status: SHIPPED | OUTPATIENT
Start: 2023-06-06 | End: 2023-11-10

## 2023-06-06 ASSESSMENT — ASTHMA QUESTIONNAIRES
QUESTION_4 LAST FOUR WEEKS HOW OFTEN HAVE YOU USED YOUR RESCUE INHALER OR NEBULIZER MEDICATION (SUCH AS ALBUTEROL): ONE OR TWO TIMES PER DAY
QUESTION_1 LAST FOUR WEEKS HOW MUCH OF THE TIME DID YOUR ASTHMA KEEP YOU FROM GETTING AS MUCH DONE AT WORK, SCHOOL OR AT HOME: MOST OF THE TIME
HOSPITALIZATION_OVERNIGHT_LAST_YEAR_TOTAL: THREE OR MORE
ACT_TOTALSCORE: 9
QUESTION_5 LAST FOUR WEEKS HOW WOULD YOU RATE YOUR ASTHMA CONTROL: SOMEWHAT CONTROLLED
QUESTION_2 LAST FOUR WEEKS HOW OFTEN HAVE YOU HAD SHORTNESS OF BREATH: MORE THAN ONCE A DAY
ACT_TOTALSCORE: 9
QUESTION_3 LAST FOUR WEEKS HOW OFTEN DID YOUR ASTHMA SYMPTOMS (WHEEZING, COUGHING, SHORTNESS OF BREATH, CHEST TIGHTNESS OR PAIN) WAKE YOU UP AT NIGHT OR EARLIER THAN USUAL IN THE MORNING: FOUR OR MORE NIGHTS A WEEK
EMERGENCY_ROOM_LAST_YEAR_TOTAL: THREE OR MORE

## 2023-06-06 ASSESSMENT — ENCOUNTER SYMPTOMS: COUGH: 1

## 2023-06-06 ASSESSMENT — PAIN SCALES - GENERAL: PAINLEVEL: NO PAIN (0)

## 2023-06-06 NOTE — TELEPHONE ENCOUNTER
"Pt has been struggling with SOB and coughing.  This morning woke up and was so short of breath he wondering if he would \"get to his nebulizer machine fast enough\"  Once he does the neb his symptoms improve and following the nebs he coughs up thick brown colored sputum.   Pt works in a box factory and has noticed his symptoms progressively getting worse to the point where he missed work yesterday.   Symptoms seem to improve over the weekends when he is off work.     Reason for Disposition    Coughing up wilmar-colored (reddish-brown) or blood-tinged sputum    Additional Information    Negative: Bluish (or gray) lips or face    Negative: SEVERE difficulty breathing (e.g., struggling for each breath, speaks in single words)    Negative: Rapid onset of cough and has hives    Negative: Coughing started suddenly after medicine, an allergic food or bee sting    Negative: Difficulty breathing after exposure to flames, smoke, or fumes    Negative: Sounds like a life-threatening emergency to the triager    Negative: Previous asthma attacks and this feels like asthma attack    Negative: Dry cough (non-productive; no sputum or minimal clear sputum) and within 14 days of COVID-19 Exposure    Negative: MODERATE difficulty breathing (e.g., speaks in phrases, SOB even at rest, pulse 100-120) and still present when not coughing    Negative: Chest pain present when not coughing    Negative: Passed out (i.e., fainted, collapsed and was not responding)    Negative: Patient sounds very sick or weak to the triager    Negative: MILD difficulty breathing (e.g., minimal/no SOB at rest, SOB with walking, pulse <100) and still present when not coughing    Negative: Coughed up > 1 tablespoon (15 ml) blood (Exception: Blood-tinged sputum.)    Negative: Fever > 103 F (39.4 C)    Negative: Fever > 101 F (38.3 C) and over 60 years of age    Negative: Fever > 100.0 F (37.8 C) and has diabetes mellitus or a weak immune system (e.g., HIV positive, " cancer chemotherapy, organ transplant, splenectomy, chronic steroids)    Negative: Increasing ankle swelling    Negative: Fever > 100.0 F (37.8 C) and bedridden (e.g., nursing home patient, stroke, chronic illness, recovering from surgery)    Negative: Wheezing is present    Negative: SEVERE coughing spells (e.g., whooping sound after coughing, vomiting after coughing)    Protocols used: COUGH-A-OH

## 2023-06-06 NOTE — PROGRESS NOTES
Assessment & Plan     Moderate persistent asthma, unspecified whether complicated  The patient currently request a letter for work exempt and him to work at his current workstation.  He needed clarification on if the material he was working with at work was worsening his breathing.  After reviewing his charts he does have a history of tobacco dependence mild COPD and asthma.  He is not taking his Anoro, his oxygen sats are above 92%.  He did get another prednisone burst, and he was encouraged to visit with allergy for allergy specific testing.  I Could not justify a work note at this time.  - levalbuterol (XOPENEX HFA) 45 MCG/ACT inhaler; Inhale 2 puffs into the lungs every 6 hours as needed for shortness of breath or wheezing  - montelukast (SINGULAIR) 10 MG tablet; Take 1 tablet (10 mg) by mouth At Bedtime  - predniSONE (DELTASONE) 20 MG tablet; Take 2 tablets (40 mg) by mouth daily for 5 days        Vincent Gomez MD  [facility] ARNIE Crook is a 57 year old, presenting for the following health issues:  Asthma and Cough        6/6/2023     3:46 PM   Additional Questions   Roomed by Kendy Santos CMA   Accompanied by self         6/6/2023     3:46 PM   Patient Reported Additional Medications   Patient reports taking the following new medications none     Cough    History of Present Illness     Asthma:  He presents for follow up of asthma.  He has some cough, no wheezing, and some shortness of breath. He is using a relief medication 2-3 times per day. He does not miss any doses of his controller medication throughout the week.Patient is aware of the following triggers: same as previous visit. The patient has not had a visit to the Emergency Room, Urgent Care or Hospital due to asthma since the last clinic visit. He has been to the Emergency Room or Urgent Care 0 times.He has had a Hospitalization 0 times.  Asthma comment:  States weekends better but week is bad, hard time  "sleeping and is sleeping in chair, states short of breath, productive coughing that is white and thick, wondering if it has anything to do with the dust at work, extremely fatigued, by end of the week his chest feels very heavy    Reason for visit:  Im concerned about my condition deteroiorating and its causeHe consumes 0 sweetened beverage(s) daily.He exercises with enough effort to increase his heart rate 60 or more minutes per day.  He exercises with enough effort to increase his heart rate 7 days per week.   He is taking medications regularly.        Review of Systems   Respiratory: Positive for cough.       Constitutional, HEENT, cardiovascular, pulmonary, GI, , musculoskeletal, neuro, skin, endocrine and psych systems are negative, except as otherwise noted.      Objective    /64   Pulse 97   Temp 98.1  F (36.7  C) (Temporal)   Resp 12   Ht 1.702 m (5' 7.01\")   Wt 70.8 kg (156 lb)   SpO2 96%   BMI 24.43 kg/m    Body mass index is 24.43 kg/m .  Physical Exam   GENERAL: healthy, alert and no distress  EYES: Eyes grossly normal to inspection, PERRL and conjunctivae and sclerae normal  HENT: ear canals and TM's normal, nose and mouth without ulcers or lesions  NECK: no adenopathy, no asymmetry, masses, or scars and thyroid normal to palpation  RESP: inspiratory wheezes bilateral  CV: regular rate and rhythm, normal S1 S2, no S3 or S4, no murmur, click or rub, no peripheral edema and peripheral pulses strong  ABDOMEN: soft, nontender, no hepatosplenomegaly, no masses and bowel sounds normal  MS: no gross musculoskeletal defects noted, no edema                "

## 2023-06-06 NOTE — TELEPHONE ENCOUNTER
PCP not in clinic today, Disposition see today, scheduled with another clinic provider per protocol.     Appointments in Next Year    Jun 06, 2023  4:00 PM  (Arrive by 3:40 PM)  Provider Visit with Vincent Gomez MD  [facility] ([facility] ) [phone]   Jun 23, 2023 10:00 AM  (Arrive by 9:40 AM)  Provider Visit with Newton Hopson MD  [facility] ([facility] ) [phone]   Nov 10, 2023 10:00 AM  (Arrive by 9:40 AM)  Adult Preventative Visit with Newton Hopson MD  [facility] ([facility] ) [phone]        PHUC Grubbs, RN  [facility] ~ Registered Nurse  Clinic Triage ~ [place] [facility]  June 6, 2023

## 2023-06-06 NOTE — LETTER
My Asthma Action Plan    Name: Armaan Agustin   YOB: 1965  Date: 6/6/2023   My doctor: Vincent Gomez MD   My clinic: [facility]        My Rescue Medicine:   Albuterol inhaler (Proair/Ventolin/Proventil HFA)  2-4 puffs EVERY 4 HOURS as needed. Use a spacer if recommended by your provider.   My Asthma Severity:   Intermittent / Exercise Induced  Know your asthma triggers: smoke and upper respiratory infections  dust at work          GREEN ZONE   Good Control    I feel good    No cough or wheeze    Can work, sleep and play without asthma symptoms       Take your asthma control medicine every day.     1. If exercise triggers your asthma, take your rescue medication    15 minutes before exercise or sports, and    During exercise if you have asthma symptoms  2. Spacer to use with inhaler: If you have a spacer, make sure to use it with your inhaler             YELLOW ZONE Getting Worse  I have ANY of these:    I do not feel good    Cough or wheeze    Chest feels tight    Wake up at night   1. Keep taking your Green Zone medications  2. Start taking your rescue medicine:    every 20 minutes for up to 1 hour. Then every 4 hours for 24-48 hours.  3. If you stay in the Yellow Zone for more than 12-24 hours, contact your doctor.  4. If you do not return to the Green Zone in 12-24 hours or you get worse, start taking your oral steroid medicine if prescribed by your provider.           RED ZONE Medical Alert - Get Help  I have ANY of these:    I feel awful    Medicine is not helping    Breathing getting harder    Trouble walking or talking    Nose opens wide to breathe       1. Take your rescue medicine NOW  2. If your provider has prescribed an oral steroid medicine, start taking it NOW  3. Call your doctor NOW  4. If you are still in the Red Zone after 20 minutes and you have not reached your doctor:    Take your rescue medicine again and    Call 911 or go to the emergency room  right away    See your regular doctor within 2 weeks of an Emergency Room or Urgent Care visit for follow-up treatment.          Annual Reminders:  Meet with Asthma Educator,  Flu Shot in the Fall, consider Pneumonia Vaccination for patients with asthma (aged 19 and older).    Pharmacy: [id number] [place] [facility] [place], [place] - [address] [address] [place] [address]    Electronically signed by Vincent Gomez MD   Date: 06/06/23                    Asthma Triggers  How To Control Things That Make Your Asthma Worse    Triggers are things that make your asthma worse.  Look at the list below to help you find your triggers and   what you can do about them. You can help prevent asthma flare-ups by staying away from your triggers.      Trigger                                                          What you can do   Cigarette Smoke  Tobacco smoke can make asthma worse. Do not allow smoking in your home, car or around you.  Be sure no one smokes at a child s day care or school.  If you smoke, ask your health care provider for ways to help you quit.  Ask family members to quit too.  Ask your health care provider for a referral to Quit Plan to help you quit smoking, or call 1-851-873-PLAN.     Colds, Flu, Bronchitis  These are common triggers of asthma. Wash your hands often.  Don t touch your eyes, nose or mouth.  Get a flu shot every year.     Dust Mites  These are tiny bugs that live in cloth or carpet. They are too small to see. Wash sheets and blankets in hot water every week.   Encase pillows and mattress in dust mite proof covers.  Avoid having carpet if you can. If you have carpet, vacuum weekly.   Use a dust mask and HEPA vacuum.   Pollen and Outdoor Mold  Some people are allergic to trees, grass, or weed pollen, or molds. Try to keep your windows closed.  Limit time out doors when pollen count is high.   Ask you health care provider about taking medicine during allergy season.     Animal Dander  Some people are allergic to skin flakes,  urine or saliva from pets with fur or feathers. Keep pets with fur or feathers out of your home.    If you can t keep the pet outdoors, then keep the pet out of your bedroom.  Keep the bedroom door closed.  Keep pets off cloth furniture and away from stuffed toys.     Mice, Rats, and Cockroaches  Some people are allergic to the waste from these pests.   Cover food and garbage.  Clean up spills and food crumbs.  Store grease in the refrigerator.   Keep food out of the bedroom.   Indoor Mold  This can be a trigger if your home has high moisture. Fix leaking faucets, pipes, or other sources of water.   Clean moldy surfaces.  Dehumidify basement if it is damp and smelly.   Smoke, Strong Odors, and Sprays  These can reduce air quality. Stay away from strong odors and sprays, such as perfume, powder, hair spray, paints, smoke incense, paint, cleaning products, candles and new carpet.   Exercise or Sports  Some people with asthma have this trigger. Be active!  Ask your doctor about taking medicine before sports or exercise to prevent symptoms.    Warm up for 5-10 minutes before and after sports or exercise.     Other Triggers of Asthma  Cold air:  Cover your nose and mouth with a scarf.  Sometimes laughing or crying can be a trigger.  Some medicines and food can trigger asthma.

## 2023-06-06 NOTE — TELEPHONE ENCOUNTER
Patient called in and another RN triaged- Disposition see in clinic today.     PCP not in clinic today, Disposition see today, scheduled with another clinic provider per protocol.     Appointments in Next Year    Jun 06, 2023  4:00 PM  (Arrive by 3:40 PM)  Provider Visit with Vincent Gomez MD  [facility] ([facility] ) [phone]   Jun 23, 2023 10:00 AM  (Arrive by 9:40 AM)  Provider Visit with Newton Hopson MD  [facility] ([facility] ) [phone]   Nov 10, 2023 10:00 AM  (Arrive by 9:40 AM)  Adult Preventative Visit with Newton Hopson MD  [facility] ([facility] ) [phone]        PHUC Grubbs, RN  [facility] ~ Registered Nurse  Clinic Triage ~ [place] [facility]  June 6, 2023

## 2023-06-06 NOTE — TELEPHONE ENCOUNTER
Call from wife Melyssa, no C2C on file, advised can take information but can not give patient information out. Caller verbalized understanding.     Duo Neb Solution- insurance won't cover unless sent as a 3 month supply, patient has had to pay out of pocket due to being sent as 1 month supply. Requesting 3 months supply be sent to pharmacy.     Prescription sent on 5/30/23 to pharmacy. RN to call pharmacy for clarification.     Caller reports patient is in need of work note for a few days due to asthma symptoms increasing- going through 4 vials of solution a day. Hoarse from all the coughing. Not sure if related to work conditions or wild fires. RN advised that due to increase in symptoms provider will most likely need to see patient in order for letter to be written. Patient is not with caller at this time. Wife to contact patient and have him reach out to nurse triage for triaging and if appropriate scheduling. Caller aware PCP is out but in appropriate to schedule can see other provider in clinic.    Patient currently has appointment for 6/23/23 but RN advised to be seen sooner and patient calling in for triaging to do so recommended.     Patient's wife verbalized understanding and all questions answered.     ADRIEL GrubbsN, RN  [facility] ~ Registered Nurse  Clinic Triage ~ [place] [facility]  June 6, 2023

## 2023-06-06 NOTE — TELEPHONE ENCOUNTER
RN called and spoke with pharmacy, new prescription was sent 5/30/2023 for a large amount and should be able to be covered with insurance and is ready for .     Called and advised patient's wife of the above. Patient's wife verbalized understanding and advised patient should be calling in for triaging any time now. Please triage upon patient call in and schedule appointment if appropriate to discuss symptoms and work note.     PHUC Grubbs, RN  [facility] ~ Registered Nurse  Clinic Triage ~ [place] [facility]  June 6, 2023

## 2023-06-23 ENCOUNTER — OFFICE VISIT (OUTPATIENT)
Dept: FAMILY MEDICINE | Facility: CLINIC | Age: 58
End: 2023-06-23
Attending: FAMILY MEDICINE
Payer: COMMERCIAL

## 2023-06-23 ENCOUNTER — ANCILLARY PROCEDURE (OUTPATIENT)
Dept: GENERAL RADIOLOGY | Facility: CLINIC | Age: 58
End: 2023-06-23
Attending: FAMILY MEDICINE
Payer: COMMERCIAL

## 2023-06-23 VITALS
RESPIRATION RATE: 20 BRPM | HEIGHT: 66 IN | HEART RATE: 89 BPM | OXYGEN SATURATION: 97 % | WEIGHT: 148 LBS | DIASTOLIC BLOOD PRESSURE: 80 MMHG | BODY MASS INDEX: 23.78 KG/M2 | TEMPERATURE: 98.8 F | SYSTOLIC BLOOD PRESSURE: 110 MMHG

## 2023-06-23 DIAGNOSIS — M79.645 PAIN OF FINGER OF LEFT HAND: ICD-10-CM

## 2023-06-23 DIAGNOSIS — J45.40 MODERATE PERSISTENT ASTHMA, UNSPECIFIED WHETHER COMPLICATED: Primary | ICD-10-CM

## 2023-06-23 PROCEDURE — 73140 X-RAY EXAM OF FINGER(S): CPT | Mod: TC | Performed by: RADIOLOGY

## 2023-06-23 PROCEDURE — 99214 OFFICE O/P EST MOD 30 MIN: CPT | Performed by: FAMILY MEDICINE

## 2023-06-23 RX ORDER — BUDESONIDE 0.5 MG/2ML
0.5 INHALANT ORAL 2 TIMES DAILY
Qty: 180 ML | Refills: 0 | Status: SHIPPED | OUTPATIENT
Start: 2023-06-23 | End: 2023-11-10

## 2023-06-23 RX ORDER — MONTELUKAST SODIUM 10 MG/1
10 TABLET ORAL AT BEDTIME
Qty: 90 TABLET | Refills: 0 | Status: CANCELLED | OUTPATIENT
Start: 2023-06-23

## 2023-06-23 RX ORDER — LEVALBUTEROL TARTRATE 45 UG/1
2 AEROSOL, METERED ORAL EVERY 6 HOURS PRN
Qty: 15 G | Refills: 0 | Status: SHIPPED | OUTPATIENT
Start: 2023-06-23 | End: 2023-11-10

## 2023-06-23 RX ORDER — UMECLIDINIUM BROMIDE AND VILANTEROL TRIFENATATE 62.5; 25 UG/1; UG/1
1 POWDER RESPIRATORY (INHALATION) DAILY
Qty: 30 EACH | Refills: 11 | Status: CANCELLED | OUTPATIENT
Start: 2023-06-23

## 2023-06-23 RX ORDER — BUDESONIDE 0.5 MG/2ML
0.5 INHALANT ORAL DAILY
Qty: 180 ML | Refills: 0 | Status: SHIPPED | OUTPATIENT
Start: 2023-06-23 | End: 2023-06-23

## 2023-06-23 ASSESSMENT — PAIN SCALES - GENERAL: PAINLEVEL: MILD PAIN (2)

## 2023-06-23 NOTE — PATIENT INSTRUCTIONS
[email or web address] coupons for the steroid inhaler pulmicort at [facility] in [place] if not covered at [facility] by insurance.

## 2023-06-23 NOTE — PROGRESS NOTES
Assessment & Plan   1. Moderate persistent asthma, unspecified whether complicated: Worsened by work environment. Discussed working with employer for better conditions/PPE. I would write a reasonable accomodation for this. Disability however, not suitable to pursue. Also trial pulmicort nebs. Inhaler to expensive for patient. Did well during prednisone burst. Will try adding this for symptom control. Follow up in 3 months.  - PRIMARY CARE FOLLOW-UP SCHEDULING  - levalbuterol (XOPENEX HFA) 45 MCG/ACT inhaler; Inhale 2 puffs into the lungs every 6 hours as needed for shortness of breath or wheezing  Dispense: 15 g; Refill: 0  - budesonide (PULMICORT) 0.5 MG/2ML neb solution; Take 2 mLs (0.5 mg) by nebulization 2 times daily  Dispense: 180 mL; Refill: 0    2. 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 Trial hand therapy. Consider referral to hand for consideration of injection, etc if not improving.  - XR Finger Left G/E 2 Views; Future  - Occupational Therapy Referral; Future        Newton Hopson MD  [facility]    Disclaimer: This note consists of symbols derived from keyboarding, dictation and/or voice recognition software. As a result, there may be errors in the script that have gone undetected. Please consider this when interpreting information found in this chart.    Yunior Crook is a 58 year old, presenting for the following health issues:  Asthma        6/23/2023     9:53 AM   Additional Questions   Roomed by Emelyn HARRISON Cma   Accompanied by Self         6/23/2023     9:53 AM   Patient Reported Additional Medications   Patient reports taking the following new medications n/a     History of Present Illness     Asthma:  He presents for follow up of asthma.  He has no cough, no wheezing, and no shortness of breath. He is using a relief medication a few times a week. He does not have a controller medication. Patient is aware of the following  "triggers: none. The patient has not had a visit to the Emergency Room, Urgent Care or Hospital due to asthma since the last clinic visit.     Patient states triggers to breathing is dust at work  Complains of pain in left thumb pain     Patient is here today for concern of asthma in relation to his work. Lots of dust around at work. Does have N95 masks available.  States his symptoms are typically worse at the end of the workday as well as work week.    He is wondering disability note clearing.  He is provided with plan.    Examining his left thumb as well.  MCP joint.  Proxy 6 weeks.  Does not recall any significant injury but quite painful off-and-on.    Review of Systems   Constitutional, HEENT, cardiovascular, pulmonary, GI, , musculoskeletal, neuro, skin, endocrine and psych systems are negative, except as otherwise noted.      Objective    /80   Pulse 89   Temp 98.8  F (37.1  C) (Temporal)   Resp 20   Ht 1.68 m (5' 6.14\")   Wt 67.1 kg (148 lb)   SpO2 97%   BMI 23.79 kg/m    Body mass index is 23.79 kg/m .  Physical Exam   General: Appears well and in no acute distress.  Cardiovascular: Regular rate and rhythm, normal S1 and S2 without murmur. No extra heartsounds or friction rub.  Respiratory: Lungs clear to auscultation bilaterally. No wheezing or crackles.  Musculoskeletal: No gross extremity deformities. No peripheral edema. Normal muscle bulk.                                                                Labs: None    XR FINGER LEFT G/E 2 VIEWS 6/23/2023 11:08 AM      HISTORY: Pain of finger of left hand     COMPARISON: None.      IMPRESSION: Mild degenerative changes in the first MCP joint. No  fractures are evident.        "

## 2023-06-23 NOTE — RESULT ENCOUNTER NOTE
Please inform of results if patient has not viewed in [employer] within 3 business days.    Your xray showed arthritis in the thumb joint in question. I'd like you to work with our hand therapists first. If you are not having improvement, we can have you see our hand orthopedic doctors to consider an injection.    Please call the clinic with any questions you may have.     Have a great day,    Dr. Ricardo
Detail Level: Detailed
Showing: +/- results
Add  To Bill: Yes

## 2023-11-10 ENCOUNTER — OFFICE VISIT (OUTPATIENT)
Dept: FAMILY MEDICINE | Facility: CLINIC | Age: 58
End: 2023-11-10
Payer: COMMERCIAL

## 2023-11-10 ENCOUNTER — TELEPHONE (OUTPATIENT)
Dept: FAMILY MEDICINE | Facility: CLINIC | Age: 58
End: 2023-11-10

## 2023-11-10 VITALS
SYSTOLIC BLOOD PRESSURE: 132 MMHG | WEIGHT: 157 LBS | RESPIRATION RATE: 18 BRPM | BODY MASS INDEX: 25.23 KG/M2 | HEART RATE: 63 BPM | DIASTOLIC BLOOD PRESSURE: 86 MMHG | TEMPERATURE: 97.7 F | OXYGEN SATURATION: 100 % | HEIGHT: 66 IN

## 2023-11-10 DIAGNOSIS — Z12.5 SCREENING FOR PROSTATE CANCER: ICD-10-CM

## 2023-11-10 DIAGNOSIS — Z13.220 LIPID SCREENING: ICD-10-CM

## 2023-11-10 DIAGNOSIS — J45.40 MODERATE PERSISTENT ASTHMA, UNSPECIFIED WHETHER COMPLICATED: ICD-10-CM

## 2023-11-10 DIAGNOSIS — J45.40 MODERATE PERSISTENT ASTHMA, UNSPECIFIED WHETHER COMPLICATED: Primary | ICD-10-CM

## 2023-11-10 DIAGNOSIS — Z13.0 SCREENING, ANEMIA, DEFICIENCY, IRON: ICD-10-CM

## 2023-11-10 DIAGNOSIS — Z00.00 ROUTINE GENERAL MEDICAL EXAMINATION AT A HEALTH CARE FACILITY: Primary | ICD-10-CM

## 2023-11-10 DIAGNOSIS — H61.21 IMPACTED CERUMEN OF RIGHT EAR: ICD-10-CM

## 2023-11-10 DIAGNOSIS — Z13.1 SCREENING FOR DIABETES MELLITUS: ICD-10-CM

## 2023-11-10 LAB
ALBUMIN SERPL BCG-MCNC: 4.3 G/DL (ref 3.5–5.2)
ALP SERPL-CCNC: 52 U/L (ref 40–129)
ALT SERPL W P-5'-P-CCNC: 20 U/L (ref 0–70)
ANION GAP SERPL CALCULATED.3IONS-SCNC: 11 MMOL/L (ref 7–15)
AST SERPL W P-5'-P-CCNC: 25 U/L (ref 0–45)
BILIRUB SERPL-MCNC: 0.3 MG/DL
BUN SERPL-MCNC: 12.5 MG/DL (ref 6–20)
CALCIUM SERPL-MCNC: 9.9 MG/DL (ref 8.6–10)
CHLORIDE SERPL-SCNC: 101 MMOL/L (ref 98–107)
CHOLEST SERPL-MCNC: 254 MG/DL
CREAT SERPL-MCNC: 0.92 MG/DL (ref 0.67–1.17)
DEPRECATED HCO3 PLAS-SCNC: 27 MMOL/L (ref 22–29)
EGFRCR SERPLBLD CKD-EPI 2021: >90 ML/MIN/1.73M2
ERYTHROCYTE [DISTWIDTH] IN BLOOD BY AUTOMATED COUNT: 11.8 % (ref 10–15)
GLUCOSE SERPL-MCNC: 107 MG/DL (ref 70–99)
HBA1C MFR BLD: 5.2 % (ref 0–5.6)
HCT VFR BLD AUTO: 43.1 % (ref 40–53)
HDLC SERPL-MCNC: 72 MG/DL
HGB BLD-MCNC: 14.6 G/DL (ref 13.3–17.7)
LDLC SERPL CALC-MCNC: 157 MG/DL
MCH RBC QN AUTO: 33 PG (ref 26.5–33)
MCHC RBC AUTO-ENTMCNC: 33.9 G/DL (ref 31.5–36.5)
MCV RBC AUTO: 98 FL (ref 78–100)
NONHDLC SERPL-MCNC: 182 MG/DL
PLATELET # BLD AUTO: 260 10E3/UL (ref 150–450)
POTASSIUM SERPL-SCNC: 4.1 MMOL/L (ref 3.4–5.3)
PROT SERPL-MCNC: 6.8 G/DL (ref 6.4–8.3)
PSA SERPL DL<=0.01 NG/ML-MCNC: 1.61 NG/ML (ref 0–3.5)
RBC # BLD AUTO: 4.42 10E6/UL (ref 4.4–5.9)
SODIUM SERPL-SCNC: 139 MMOL/L (ref 135–145)
TRIGL SERPL-MCNC: 126 MG/DL
WBC # BLD AUTO: 5.7 10E3/UL (ref 4–11)

## 2023-11-10 PROCEDURE — 99213 OFFICE O/P EST LOW 20 MIN: CPT | Mod: 25 | Performed by: FAMILY MEDICINE

## 2023-11-10 PROCEDURE — 36415 COLL VENOUS BLD VENIPUNCTURE: CPT | Performed by: FAMILY MEDICINE

## 2023-11-10 PROCEDURE — 99396 PREV VISIT EST AGE 40-64: CPT | Mod: 25 | Performed by: FAMILY MEDICINE

## 2023-11-10 PROCEDURE — 80053 COMPREHEN METABOLIC PANEL: CPT | Performed by: FAMILY MEDICINE

## 2023-11-10 PROCEDURE — 83036 HEMOGLOBIN GLYCOSYLATED A1C: CPT | Performed by: FAMILY MEDICINE

## 2023-11-10 PROCEDURE — 85027 COMPLETE CBC AUTOMATED: CPT | Performed by: FAMILY MEDICINE

## 2023-11-10 PROCEDURE — 80061 LIPID PANEL: CPT | Performed by: FAMILY MEDICINE

## 2023-11-10 PROCEDURE — G0103 PSA SCREENING: HCPCS | Performed by: FAMILY MEDICINE

## 2023-11-10 PROCEDURE — 69209 REMOVE IMPACTED EAR WAX UNI: CPT | Mod: RT | Performed by: FAMILY MEDICINE

## 2023-11-10 RX ORDER — IPRATROPIUM BROMIDE AND ALBUTEROL SULFATE 2.5; .5 MG/3ML; MG/3ML
1 SOLUTION RESPIRATORY (INHALATION) EVERY 6 HOURS PRN
Qty: 1620 ML | Refills: 1 | Status: SHIPPED | OUTPATIENT
Start: 2023-11-10 | End: 2024-06-20

## 2023-11-10 RX ORDER — LEVALBUTEROL TARTRATE 45 UG/1
2 AEROSOL, METERED ORAL EVERY 6 HOURS PRN
Qty: 15 G | Refills: 3 | Status: SHIPPED | OUTPATIENT
Start: 2023-11-10 | End: 2023-11-10

## 2023-11-10 RX ORDER — BUDESONIDE 0.5 MG/2ML
0.5 INHALANT ORAL 2 TIMES DAILY
Qty: 180 ML | Refills: 3 | Status: SHIPPED | OUTPATIENT
Start: 2023-11-10 | End: 2024-06-20

## 2023-11-10 RX ORDER — ALBUTEROL SULFATE 90 UG/1
2 AEROSOL, METERED RESPIRATORY (INHALATION) EVERY 6 HOURS PRN
Qty: 18 G | Refills: 3 | Status: SHIPPED | OUTPATIENT
Start: 2023-11-10 | End: 2024-06-20

## 2023-11-10 ASSESSMENT — ENCOUNTER SYMPTOMS
MYALGIAS: 0
NERVOUS/ANXIOUS: 0
PALPITATIONS: 0
DYSURIA: 0
FEVER: 0
SHORTNESS OF BREATH: 0
HEMATOCHEZIA: 0
HEARTBURN: 0
WEAKNESS: 0
DIZZINESS: 0
EYE PAIN: 0
HEADACHES: 0
COUGH: 0
CONSTIPATION: 0
PARESTHESIAS: 0
HEMATURIA: 0
CHILLS: 0
SORE THROAT: 0
NAUSEA: 0
DIARRHEA: 0
FREQUENCY: 0
ABDOMINAL PAIN: 0
ARTHRALGIAS: 0
JOINT SWELLING: 0

## 2023-11-10 ASSESSMENT — PAIN SCALES - GENERAL: PAINLEVEL: NO PAIN (0)

## 2023-11-10 ASSESSMENT — ASTHMA QUESTIONNAIRES: ACT_TOTALSCORE: 20

## 2023-11-10 NOTE — TELEPHONE ENCOUNTER
Outpatient Medication Detail     Disp Refills Start End GONZALO   levalbuterol (XOPENEX HFA) 45 MCG/ACT inhaler 15 g 3 11/10/2023  No   Sig - Route: Inhale 2 puffs into the lungs every 6 hours as needed for shortness of breath or wheezing - Inhalation   Sent to pharmacy as: Levalbuterol Tartrate 45 MCG/ACT Inhalation Aerosol (XOPENEX HFA)   Class: E-Prescribe   Order: [id number]   E-Prescribing Status: Receipt confirmed by pharmacy (11/10/2023 10:10 AM CST)       Pharmacy Message:  Xopenex not covered.  Can you please change to albuterol inhaler?

## 2023-11-10 NOTE — RESULT ENCOUNTER NOTE
Please inform of results if patient has not viewed in [employer] within 3 business days.    A1c - Your 3 month blood sugar average was 5.2.    CBC Results - Your cell counts were normal.    Please call the clinic with any questions you may have.     Have a great day,    Dr. Ricardo

## 2023-11-10 NOTE — PROGRESS NOTES
SUBJECTIVE:   CC: Armaan is an 58 year old who presents for preventative health visit.       11/10/2023     9:43 AM   Additional Questions   Roomed by [facility]         11/10/2023     9:43 AM   Patient Reported Additional Medications   Patient reports taking the following new medications Omeprazole     Healthy Habits:     Getting at least 3 servings of Calcium per day:  NO    Bi-annual eye exam:  Yes    Dental care twice a year:  Yes    Sleep apnea or symptoms of sleep apnea:  None    Diet:  Other    Frequency of exercise:  4-5 days/week    Duration of exercise:  45-60 minutes    Taking medications regularly:  Yes    Additional concerns today:  No    Asthma improved since quitting his old job. Requesting inhaler refills. No other concerns.    Social History     Tobacco Use     Smoking status: Former     Packs/day: .05     Types: Cigarettes     Start date:      Quit date: 2022     Years since quittin.1     Passive exposure: Past     Smokeless tobacco: Never   Substance Use Topics     Alcohol use: Yes     Alcohol/week: 3.0 standard drinks of alcohol     Types: 3 Glasses of wine per week     Comment: 3 drinks per week         11/10/2023     9:40 AM   Alcohol Use   Prescreen: >3 drinks/day or >7 drinks/week? No     Last PSA:   Prostate Specific Antigen Screen   Date Value Ref Range Status   2022 0.60 0.00 - 4.00 ug/L Final       Reviewed orders with patient. Reviewed health maintenance and updated orders accordingly - Yes    Lab work is in process  BP Readings from Last 3 Encounters:   11/10/23 132/86   23 110/80   23 130/64    Wt Readings from Last 3 Encounters:   11/10/23 71.2 kg (157 lb)   23 67.1 kg (148 lb)   23 70.8 kg (156 lb)                  Patient Active Problem List   Diagnosis     Acute respiratory failure with hypoxia (H)     Dysphagia, unspecified type     Personal history of tobacco use, presenting hazards to health     Past Surgical History:   Procedure Laterality  Date     FINGER SURGERY Left     index     ZZC RECONSTRUCT SCAPHOID CARPAL W PROSTHESIS Right        Social History     Tobacco Use     Smoking status: Former     Packs/day: .05     Types: Cigarettes     Start date:      Quit date: 2022     Years since quittin.2     Passive exposure: Past     Smokeless tobacco: Never   Substance Use Topics     Alcohol use: Yes     Alcohol/week: 3.0 standard drinks of alcohol     Types: 3 Glasses of wine per week     Comment: 3 drinks per week     Family History   Problem Relation Age of Onset     Cerebrovascular Disease Mother 90     Cancer Father 64        colon cancer     Heart Disease Father      Chronic Obstructive Pulmonary Disease Brother      Lymphoma Brother      Heart Disease Brother      Breast Cancer No family hx of      Prostate Cancer No family hx of      Celiac Disease No family hx of      Ulcerative Colitis No family hx of      Crohn's Disease No family hx of          Current Outpatient Medications   Medication Sig Dispense Refill     budesonide (PULMICORT) 0.5 MG/2ML neb solution Take 2 mLs (0.5 mg) by nebulization 2 times daily 180 mL 3     ipratropium - albuterol 0.5 mg/2.5 mg/3 mL (DUONEB) 0.5-2.5 (3) MG/3ML neb solution Take 1 vial (3 mLs) by nebulization every 6 hours as needed for shortness of breath or wheezing 1620 mL 1     albuterol (PROAIR HFA/PROVENTIL HFA/VENTOLIN HFA) 108 (90 Base) MCG/ACT inhaler Inhale 2 puffs into the lungs every 6 hours as needed for shortness of breath, wheezing or cough 18 g 3     Allergies   Allergen Reactions     Latex Rash         Reviewed and updated as needed this visit by clinical staff   Tobacco  Allergies  Meds              Reviewed and updated as needed this visit by Provider                 Past Medical History:   Diagnosis Date     NO ACTIVE PROBLEMS       Past Surgical History:   Procedure Laterality Date     FINGER SURGERY Left 1982    index     ZZC RECONSTRUCT SCAPHOID CARPAL W PROSTHESIS Right   "      Review of Systems   Constitutional:  Negative for chills and fever.   HENT:  Negative for congestion, ear pain, hearing loss and sore throat.    Eyes:  Negative for pain and visual disturbance.   Respiratory:  Negative for cough and shortness of breath.    Cardiovascular:  Negative for chest pain, palpitations and peripheral edema.   Gastrointestinal:  Negative for abdominal pain, constipation, diarrhea, heartburn, hematochezia and nausea.   Genitourinary:  Negative for dysuria, frequency, genital sores, hematuria, impotence, penile discharge and urgency.   Musculoskeletal:  Negative for arthralgias, joint swelling and myalgias.   Skin:  Negative for rash.   Neurological:  Negative for dizziness, weakness, headaches and paresthesias.   Psychiatric/Behavioral:  Negative for mood changes. The patient is not nervous/anxious.      OBJECTIVE:   /86 (BP Location: Right arm, Patient Position: Chair, Cuff Size: Adult Regular)   Pulse 63   Temp 97.7  F (36.5  C) (Temporal)   Resp 18   Ht 1.683 m (5' 6.25\")   Wt 71.2 kg (157 lb)   SpO2 100%   BMI 25.15 kg/m      Physical Exam  GENERAL: healthy, alert and no distress  EYES: Eyes grossly normal to inspection, PERRL and conjunctivae and sclerae normal  HENT: Impacted cerumen right ear. Ear canals and TM's otherwise normal, nose and mouth without ulcers or lesions  NECK: no adenopathy, no asymmetry, masses, or scars and thyroid normal to palpation  RESP: lungs clear to auscultation - no rales, rhonchi or wheezes  CV: regular rate and rhythm, normal S1 S2, no S3 or S4, no murmur, click or rub, no peripheral edema and peripheral pulses strong  ABDOMEN: soft, nontender, no hepatosplenomegaly, no masses and bowel sounds normal  MS: no gross musculoskeletal defects noted, no edema  SKIN: no suspicious lesions or rashes  NEURO: Normal strength and tone, mentation intact and speech normal  PSYCH: mentation appears normal, affect " normal/bright    Labs:pending    ASSESSMENT/PLAN:   1. Routine general medical examination at a health care facility  Discussed personal health and safety. Routine screenings as below. Appropriate anticipatory guidance, vaccinations, and health screening recommendations delivered according to the USPSTF and other appropriate society guidelines.  Patient understands and is agreeable with the plan ordered below.  - PSA, screen; Future  - Lipid panel reflex to direct LDL Non-fasting; Future  - Comprehensive metabolic panel (BMP + Alb, Alk Phos, ALT, AST, Total. Bili, TP); Future  - CBC with platelets; Future  - Hemoglobin A1c; Future    2. Moderate persistent asthma, unspecified whether complicated  Refills given.  - budesonide (PULMICORT) 0.5 MG/2ML neb solution; Take 2 mLs (0.5 mg) by nebulization 2 times daily  Dispense: 180 mL; Refill: 3    3. Impacted cerumen of right ear  Irrigated by MA staff.  - HI REMOVAL IMPACTED CERUMEN IRRIGATION/LVG UNILAT    4. Lipid screening  - Lipid panel reflex to direct LDL Non-fasting; Future  - Lipid panel reflex to direct LDL Non-fasting    5. Screening for diabetes mellitus  - Comprehensive metabolic panel (BMP + Alb, Alk Phos, ALT, AST, Total. Bili, TP); Future  - Hemoglobin A1c; Future  - Comprehensive metabolic panel (BMP + Alb, Alk Phos, ALT, AST, Total. Bili, TP)  - Hemoglobin A1c    6. Screening, anemia, deficiency, iron  - CBC with platelets; Future  - CBC with platelets    7. Screening for prostate cancer  Patient elects to undergo PSA screening after informed decision making process. This discussion with the patient included reviewing the benefits of testing such as: Ability to easily add on to existing blood work, screening for a common cancer in men which has treatment options and otherwise may have been silent, and possibility for early detection to prevent morbidity from future metastasis and/or death. Additionally, risks were discussed including possibility of false  "positive testing (leading to anxiety and further unnecessary testing/biopsies), possibility of over treating a cancer that may not affect a man in his lifetime, and the side effects of the current treatment options for prosate cancer (urinary incontinence, ED, etc).  - PSA, screen; Future  - PSA, screen      COUNSELING:     Reviewed preventive health counseling, as reflected in patient instructions       Regular exercise       Healthy diet/nutrition       Vision screening       Hearing screening       Colorectal cancer screening       Prostate cancer screening    BMI:   Estimated body mass index is 25.15 kg/m  as calculated from the following:    Height as of this encounter: 1.683 m (5' 6.25\").    Weight as of this encounter: 71.2 kg (157 lb).   Weight management plan: Discussed healthy diet and exercise guidelines      He reports that he quit smoking about 14 months ago. His smoking use included cigarettes. He started smoking about 5 years ago. He smoked an average of .05 packs per day. He has been exposed to tobacco smoke. He has never used smokeless tobacco.    Newton Hopson MD  [facility]    Disclaimer: This note consists of symbols derived from keyboarding, dictation and/or voice recognition software. As a result, there may be errors in the script that have gone undetected. Please consider this when interpreting information found in this chart.  "

## 2023-11-10 NOTE — PATIENT INSTRUCTIONS
Call your insurance about where to get the shingles vaccine if interested.  Preventive Health Recommendations  Male Ages 50 - 64    Yearly exam:             See your health care provider every year in order to  o   Review health changes.   o   Discuss preventive care.    o   Review your medicines if your doctor has prescribed any.   Have a cholesterol test every 5 years, or more frequently if you are at risk for high cholesterol/heart disease.   Have a diabetes test (fasting glucose) every three years. If you are at risk for diabetes, you should have this test more often.   Have a colonoscopy at age 45, or have a yearly FIT test (stool test). These exams will check for colon cancer.    Talk with your health care provider about whether or not a prostate cancer screening test (PSA) is right for you.  You should be tested each year for STDs (sexually transmitted diseases), if you re at risk.     Shots: Get a flu shot each year. Get a tetanus shot every 10 years.     Nutrition:  Eat at least 5 servings of fruits and vegetables daily.   Eat whole-grain bread, whole-wheat pasta and brown rice instead of white grains and rice.   Get adequate Calcium and Vitamin D.     Lifestyle  Exercise for at least 150 minutes a week (30 minutes a day, 5 days a week). This will help you control your weight and prevent disease.   Limit alcohol to one drink per day.   No smoking.   Wear sunscreen to prevent skin cancer.   See your dentist every six months for an exam and cleaning.   See your eye doctor every 1 to 2 years.

## 2023-11-13 NOTE — RESULT ENCOUNTER NOTE
"Please inform of results if patient has not viewed in [facility] within 3 business days.    Lipids - Your \"bad\" cholesterol was elevated. This can be improved with diet and exercise. All animal based foods such as meat, dairy, and eggs contain cholesterol. All plant based foods such as fruits, nuts, and vegetables do not.    You do not need a medication for this at this time.    CMP Results - Your blood sugar was 107. Your kidney function, electrolytes, and liver function were normal.    PSA - Your Prostate cancer screening lab results were normal.    Please call the clinic with any questions you may have.     Have a great day,    Dr. Ricardo    The 10-year ASCVD risk score (Jeffy ADRIAN, et al., 2019) is: 6.7%    Values used to calculate the score:      Age: 58 years      Sex: Male      Is Non- : No      Diabetic: No      Tobacco smoker: No      Systolic Blood Pressure: 132 mmHg      Is BP treated: No      HDL Cholesterol: 72 mg/dL      Total Cholesterol: 254 mg/dL"

## 2023-11-16 ENCOUNTER — TELEPHONE (OUTPATIENT)
Dept: FAMILY MEDICINE | Facility: CLINIC | Age: 58
End: 2023-11-16
Payer: COMMERCIAL

## 2023-11-16 NOTE — TELEPHONE ENCOUNTER
"----- Message from Glenys Johnson sent at 11/16/2023  7:06 AM CST -----      ----- Message -----  From: Glenys Johnson  Sent: 11/16/2023   6:52 AM CST  To: [facility] Pool      ----- Message -----  From: Newton Hopson MD  Sent: 11/13/2023   8:23 AM CST  To: [facility] Patient Care Team    Please inform of results if patient has not viewed in [facility] within 3 business days.    Lipids - Your \"bad\" cholesterol was elevated. This can be improved with diet and exercise. All animal based foods such as meat, dairy, and eggs contain cholesterol. All plant based foods such as fruits, nuts, and vegetables do not.    You do not need a medication for this at this time.    CMP Results - Your blood sugar was 107. Your kidney function, electrolytes, and liver function were normal.    PSA - Your Prostate cancer screening lab results were normal.    Please call the clinic with any questions you may have.     Have a great day,    Dr. Ricardo    The 10-year ASCVD risk score (Jeffy ADRIAN, et al., 2019) is: 6.7%    Values used to calculate the score:      Age: 58 years      Sex: Male      Is Non- : No      Diabetic: No      Tobacco smoker: No      Systolic Blood Pressure: 132 mmHg      Is BP treated: No      HDL Cholesterol: 72 mg/dL      Total Cholesterol: 254 mg/dL    "

## 2023-11-16 NOTE — TELEPHONE ENCOUNTER
"RN Triage    Patient Contact    Attempt # 1    Was call answered?  No.  Left message on voicemail with information to call me back.    Upon patient callback please inform of results below form provider:    \"Please inform of results if patient has not viewed in [employer] within 3 business days.     Lipids - Your \"bad\" cholesterol was elevated. This can be improved with diet and exercise. All animal based foods such as meat, dairy, and eggs contain cholesterol. All plant based foods such as fruits, nuts, and vegetables do not.     You do not need a medication for this at this time.     CMP Results - Your blood sugar was 107. Your kidney function, electrolytes, and liver function were normal.     PSA - Your Prostate cancer screening lab results were normal.     Please call the clinic with any questions you may have.      Have a great day,     Dr. Ricardo     The 10-year ASCVD risk score (Jeffy ADRIAN, et al., 2019) is: 6.7%    Values used to calculate the score:      Age: 58 years      Sex: Male      Is Non- : No      Diabetic: No      Tobacco smoker: No      Systolic Blood Pressure: 132 mmHg      Is BP treated: No      HDL Cholesterol: 72 mg/dL      Total Cholesterol: 254 mg/dL\"     "

## 2023-11-16 NOTE — LETTER
"November 17, 2023      Armaan Agustin  [address] [address] [place] [id number]        Dear ,    We are writing to inform you of your test results.    Lipids - Your \"bad\" cholesterol was elevated. This can be improved with diet and exercise. All animal based foods such as meat, dairy, and eggs contain cholesterol. All plant based foods such as fruits, nuts, and vegetables do not.     You do not need a medication for this at this time.     CMP Results - Your blood sugar was 107. Your kidney function, electrolytes, and liver function were normal.     PSA - Your Prostate cancer screening lab results were normal.    The 10-year ASCVD risk score (Jeffy ADRIAN, et al., 2019) is: 6.7%    Values used to calculate the score:      Age: 58 years      Sex: Male      Is Non- : No      Diabetic: No      Tobacco smoker: No      Systolic Blood Pressure: 132 mmHg      Is BP treated: No      HDL Cholesterol: 72 mg/dL      Total Cholesterol: 254 mg/dL       Resulted Orders   PSA, screen   Result Value Ref Range    Prostate Specific Antigen Screen 1.61 0.00 - 3.50 ng/mL    Narrative    This result is obtained using the Roche Elecsys total PSA method on the dion e801 immunoassay analyzer. Results obtained with different assay methods or kits cannot be used interchangeably.   Lipid panel reflex to direct LDL Non-fasting   Result Value Ref Range    Cholesterol 254 (H) <200 mg/dL    Triglycerides 126 <150 mg/dL    Direct Measure HDL 72 >=40 mg/dL    LDL Cholesterol Calculated 157 (H) <=100 mg/dL    Non HDL Cholesterol 182 (H) <130 mg/dL    Narrative    Cholesterol  Desirable:  <200 mg/dL    Triglycerides  Normal:  Less than 150 mg/dL  Borderline High:  150-199 mg/dL  High:  200-499 mg/dL  Very High:  Greater than or equal to 500 mg/dL    Direct Measure HDL  Female:  Greater than or equal to 50 mg/dL   Male:  Greater than or equal to 40 mg/dL    LDL Cholesterol  Desirable:  <100mg/dL  Above Desirable:  100-129 " mg/dL   Borderline High:  130-159 mg/dL   High:  160-189 mg/dL   Very High:  >= 190 mg/dL    Non HDL Cholesterol  Desirable:  130 mg/dL  Above Desirable:  130-159 mg/dL  Borderline High:  160-189 mg/dL  High:  190-219 mg/dL  Very High:  Greater than or equal to 220 mg/dL   Comprehensive metabolic panel (BMP + Alb, Alk Phos, ALT, AST, Total. Bili, TP)   Result Value Ref Range    Sodium 139 135 - 145 mmol/L      Comment:      Reference intervals for this test were updated on 09/26/2023 to more accurately reflect our healthy population. There may be differences in the flagging of prior results with similar values performed with this method. Interpretation of those prior results can be made in the context of the updated reference intervals.     Potassium 4.1 3.4 - 5.3 mmol/L    Carbon Dioxide (CO2) 27 22 - 29 mmol/L    Anion Gap 11 7 - 15 mmol/L    Urea Nitrogen 12.5 6.0 - 20.0 mg/dL    Creatinine 0.92 0.67 - 1.17 mg/dL    GFR Estimate >90 >60 mL/min/1.73m2    Calcium 9.9 8.6 - 10.0 mg/dL    Chloride 101 98 - 107 mmol/L    Glucose 107 (H) 70 - 99 mg/dL    Alkaline Phosphatase 52 40 - 129 U/L    AST 25 0 - 45 U/L      Comment:      Reference intervals for this test were updated on 6/12/2023 to more accurately reflect our healthy population. There may be differences in the flagging of prior results with similar values performed with this method. Interpretation of those prior results can be made in the context of the updated reference intervals.    ALT 20 0 - 70 U/L      Comment:      Reference intervals for this test were updated on 6/12/2023 to more accurately reflect our healthy population. There may be differences in the flagging of prior results with similar values performed with this method. Interpretation of those prior results can be made in the context of the updated reference intervals.      Protein Total 6.8 6.4 - 8.3 g/dL    Albumin 4.3 3.5 - 5.2 g/dL    Bilirubin Total 0.3 <=1.2 mg/dL   CBC with platelets    Result Value Ref Range    WBC Count 5.7 4.0 - 11.0 10e3/uL    RBC Count 4.42 4.40 - 5.90 10e6/uL    Hemoglobin 14.6 13.3 - 17.7 g/dL    Hematocrit 43.1 40.0 - 53.0 %    MCV 98 78 - 100 fL    MCH 33.0 26.5 - 33.0 pg    MCHC 33.9 31.5 - 36.5 g/dL    RDW 11.8 10.0 - 15.0 %    Platelet Count 260 150 - 450 10e3/uL   Hemoglobin A1c   Result Value Ref Range    Hemoglobin A1C 5.2 0.0 - 5.6 %      Comment:      Normal <5.7%   Prediabetes 5.7-6.4%    Diabetes 6.5% or higher     Note: Adopted from ADA consensus guidelines.       If you have any questions or concerns, please call the clinic at the number listed above.       Sincerely,      [facility] Team

## 2023-11-17 NOTE — TELEPHONE ENCOUNTER
Patient Contact    Attempt # 2    Was call answered?  No.  Unable to leave message. Mailbox full.     Result letter printed and mailed to patient.     Closing encounter.     Sapphire MORELN, RN  [facility]

## 2024-03-15 ENCOUNTER — TELEPHONE (OUTPATIENT)
Dept: FAMILY MEDICINE | Facility: CLINIC | Age: 59
End: 2024-03-15
Payer: COMMERCIAL

## 2024-03-15 DIAGNOSIS — J96.01 ACUTE RESPIRATORY FAILURE WITH HYPOXIA (H): Primary | ICD-10-CM

## 2024-03-15 RX ORDER — PREDNISONE 10 MG/1
TABLET ORAL
Qty: 9 TABLET | Refills: 0 | Status: SHIPPED | OUTPATIENT
Start: 2024-03-15 | End: 2024-03-21

## 2024-03-15 NOTE — TELEPHONE ENCOUNTER
Boarded a plane this morning, forgot prednisone at home. Calling wondering if ok to wait or can next doses be sent to pharmacy where he lands, needs through Tuesday.     Belgica Peralta RN  [facility] - Registered Nurse  Clinic Triage [place]   March 15, 2024

## 2024-03-15 NOTE — TELEPHONE ENCOUNTER
"RN Triage    Patient Contact    Attempt # 1    Was call answered?  No.  Unable to leave voicemail.     Upon patient callback please advise of the below from provider in regards to forgetting Prednisone while traveling:    \"Should continue, let me know what pharmacy to send it too.   Newton Hopson MD\"    [facility] advising to call clinic with information due to going into the weekend.     PHUC Grubbs, RN  [facility] ~ Registered Nurse  Clinic Triage ~ [place] [facility] [place]  March 15, 2024           "

## 2024-03-15 NOTE — TELEPHONE ENCOUNTER
Please send to [facility]  [address]   Tele: [phone]    Sapphire MORELN, RN  [facility]

## 2024-03-15 NOTE — TELEPHONE ENCOUNTER
RN placed call to patient's wife and advised prescription was sent, may have an extra tablet or two. Patient's wife to follow up with patient regarding where he is on his taper as she has this written on the calendar. Patient's wife verbalized understanding and all questions answered.     ADRIEL GrubbsN, RN  [facility] ~ Registered Nurse  Clinic Triage ~ [place] [facility] [place]  March 15, 2024

## 2024-03-15 NOTE — TELEPHONE ENCOUNTER
RN called and spoke with patient to determine what day of taper he is on.   Patient was unsure and asked RN to call his wife who has it on a calendar.     Spoke with patient's wife   Patient has started his two tablet tapers.    Patient will need:    2 tablets today  1 tablet Sunday  1 tablet Monday  1 tablet Tuesday   0.5 tablet Wednesday  0.5 tablet Thursday    PHUC Grubbs, RN  [facility] ~ Registered Nurse  Clinic Triage ~ [place] [facility] [place]  March 15, 2024

## 2024-05-28 ASSESSMENT — ASTHMA QUESTIONNAIRES
QUESTION_5 LAST FOUR WEEKS HOW WOULD YOU RATE YOUR ASTHMA CONTROL: WELL CONTROLLED
QUESTION_3 LAST FOUR WEEKS HOW OFTEN DID YOUR ASTHMA SYMPTOMS (WHEEZING, COUGHING, SHORTNESS OF BREATH, CHEST TIGHTNESS OR PAIN) WAKE YOU UP AT NIGHT OR EARLIER THAN USUAL IN THE MORNING: NOT AT ALL
QUESTION_2 LAST FOUR WEEKS HOW OFTEN HAVE YOU HAD SHORTNESS OF BREATH: NOT AT ALL
QUESTION_1 LAST FOUR WEEKS HOW MUCH OF THE TIME DID YOUR ASTHMA KEEP YOU FROM GETTING AS MUCH DONE AT WORK, SCHOOL OR AT HOME: NONE OF THE TIME
ACT_TOTALSCORE: 22
ACT_TOTALSCORE: 22
QUESTION_4 LAST FOUR WEEKS HOW OFTEN HAVE YOU USED YOUR RESCUE INHALER OR NEBULIZER MEDICATION (SUCH AS ALBUTEROL): TWO OR THREE TIMES PER WEEK

## 2024-05-30 ENCOUNTER — VIRTUAL VISIT (OUTPATIENT)
Dept: FAMILY MEDICINE | Facility: CLINIC | Age: 59
End: 2024-05-30
Payer: COMMERCIAL

## 2024-05-30 DIAGNOSIS — J01.00 ACUTE MAXILLARY SINUSITIS, RECURRENCE NOT SPECIFIED: Primary | ICD-10-CM

## 2024-05-30 PROCEDURE — 99443 PR PHYSICIAN TELEPHONE EVALUATION 21-30 MIN: CPT | Mod: 95 | Performed by: FAMILY MEDICINE

## 2024-05-30 RX ORDER — FLUTICASONE FUROATE, UMECLIDINIUM BROMIDE AND VILANTEROL TRIFENATATE 100; 62.5; 25 UG/1; UG/1; UG/1
1 POWDER RESPIRATORY (INHALATION) DAILY
COMMUNITY
Start: 2024-03-07 | End: 2024-06-20

## 2024-05-30 RX ORDER — PREDNISONE 20 MG/1
40 TABLET ORAL DAILY
Qty: 10 TABLET | Refills: 0 | Status: SHIPPED | OUTPATIENT
Start: 2024-05-30 | End: 2024-06-04

## 2024-05-30 RX ORDER — FLUTICASONE FUROATE, UMECLIDINIUM BROMIDE AND VILANTEROL TRIFENATATE 100; 62.5; 25 UG/1; UG/1; UG/1
1 POWDER RESPIRATORY (INHALATION) DAILY
COMMUNITY
Start: 2024-03-08 | End: 2024-06-20

## 2024-05-30 NOTE — PROGRESS NOTES
"Armaan is a 58 year old who is being evaluated via a billable video visit.    How would you like to obtain your AVS? [employer]  If the video visit is dropped, the invitation should be resent by: Text to cell phone: [phone]  Will anyone else be joining your video visit? No    VISIT WAS CHANGED TO A TELEPHONE VISIT DUE TO THE PATIENT HAVING DIFFICULTIES SIGNING IN FOR A VIDEO VISIT.      Instructions Relayed to Patient by Virtual Roomer:     Patient is active on [employer]:   Relayed following to patient: \"It looks like you are active on [employer], are you able to join the visit this way? If not, do you need us to send you a link now or would you like your provider to send a link via text or email when they are ready to initiate the visit?\"      Patient Confirmed they will join visit via: Text Link to Cell Phone  Reminded patient to ensure they were logged on to virtual visit by arrival time listed.   Asked if patient has flexibility to initiate visit sooner than arrival time: patient is unable to initiate visit earlier than arrival time     If pediatric virtual visit, ensured pediatric patient along with parent/guardian will be present for video visit.     Patient offered the website [email or web address] and/or phone number to [employer] Help line: [phone]     Assessment & Plan     Acute maxillary sinusitis, recurrence not specified  Symptomatic therapy suggested: push fluids, rest, gargle warm salt water, use vaporizer or mist needed , and use acetaminophen, ibuprofen as needed.   - amoxicillin-clavulanate (AUGMENTIN) 875-125 MG tablet; Take 1 tablet by mouth 2 times daily for 7 days  - predniSONE (DELTASONE) 20 MG tablet; Take 2 tablets (40 mg) by mouth daily for 5 days        BMI  Estimated body mass index is 25.15 kg/m  as calculated from the following:    Height as of 11/10/23: 1.683 m (5' 6.25\").    Weight as of 11/10/23: 71.2 kg (157 lb).       Subjective   Armaan is a 58 year old, " presenting for the following health issues:  Sinus Problem      5/30/2024    10:58 AM   Additional Questions   Roomed by Alisia         5/30/2024    10:58 AM   Patient Reported Additional Medications   Patient reports taking the following new medications none     Sinus Problem     History of Present Illness       Reason for visit:  Sinus infection? Yellow mucus  Symptom onset:  1-2 weeks ago  Symptoms include:  Thick yellow mucus, congestion, slight headache  Symptom intensity:  Moderate  Symptom progression:  Staying the same  Had these symptoms before:  Yes  Has tried/received treatment for these symptoms:  Yes  Previous treatment was successful:  Yes  Prior treatment description:  Antibiotics  What makes it worse:  Allergies  What makes it better:  Sudafed    He eats 2-3 servings of fruits and vegetables daily.He consumes 0 sweetened beverage(s) daily.He exercises with enough effort to increase his heart rate 9 or less minutes per day.  He exercises with enough effort to increase his heart rate 3 or less days per week.   He is taking medications regularly.           Review of Systems  Constitutional, HEENT, cardiovascular, pulmonary, GI, , musculoskeletal, neuro, skin, endocrine and psych systems are negative, except as otherwise noted.      Objective           Vitals:  No vitals were obtained today due to virtual visit.    Physical Exam   GENERAL: alert and no distress  EYES: Eyes grossly normal to inspection.  No discharge or erythema, or obvious scleral/conjunctival abnormalities.  RESP: No audible wheeze, cough, or visible cyanosis.    SKIN: Visible skin clear. No significant rash, abnormal pigmentation or lesions.  NEURO: Cranial nerves grossly intact.  Mentation and speech appropriate for age.  PSYCH: Appropriate affect, tone, and pace of words    Telephone 21 minutes.

## 2024-06-20 ENCOUNTER — OFFICE VISIT (OUTPATIENT)
Dept: FAMILY MEDICINE | Facility: CLINIC | Age: 59
End: 2024-06-20
Payer: COMMERCIAL

## 2024-06-20 VITALS
DIASTOLIC BLOOD PRESSURE: 80 MMHG | SYSTOLIC BLOOD PRESSURE: 120 MMHG | WEIGHT: 149.31 LBS | BODY MASS INDEX: 24 KG/M2 | RESPIRATION RATE: 20 BRPM | HEIGHT: 66 IN | TEMPERATURE: 98 F | HEART RATE: 87 BPM | OXYGEN SATURATION: 97 %

## 2024-06-20 DIAGNOSIS — R41.840 INATTENTION: Primary | ICD-10-CM

## 2024-06-20 DIAGNOSIS — J44.9 CHRONIC OBSTRUCTIVE PULMONARY DISEASE, UNSPECIFIED COPD TYPE (H): ICD-10-CM

## 2024-06-20 DIAGNOSIS — R21 RASH AND NONSPECIFIC SKIN ERUPTION: ICD-10-CM

## 2024-06-20 DIAGNOSIS — J45.40 MODERATE PERSISTENT ASTHMA, UNSPECIFIED WHETHER COMPLICATED: ICD-10-CM

## 2024-06-20 PROBLEM — J96.01 ACUTE RESPIRATORY FAILURE WITH HYPOXIA (H): Status: RESOLVED | Noted: 2022-11-09 | Resolved: 2024-06-20

## 2024-06-20 PROCEDURE — 86695 HERPES SIMPLEX TYPE 1 TEST: CPT | Performed by: FAMILY MEDICINE

## 2024-06-20 PROCEDURE — 86696 HERPES SIMPLEX TYPE 2 TEST: CPT | Performed by: FAMILY MEDICINE

## 2024-06-20 PROCEDURE — 99214 OFFICE O/P EST MOD 30 MIN: CPT | Performed by: FAMILY MEDICINE

## 2024-06-20 PROCEDURE — 36415 COLL VENOUS BLD VENIPUNCTURE: CPT | Performed by: FAMILY MEDICINE

## 2024-06-20 RX ORDER — BUDESONIDE 0.5 MG/2ML
0.5 INHALANT ORAL 2 TIMES DAILY
Qty: 180 ML | Refills: 3 | Status: SHIPPED | OUTPATIENT
Start: 2024-06-20

## 2024-06-20 RX ORDER — VALACYCLOVIR HYDROCHLORIDE 1 G/1
1000 TABLET, FILM COATED ORAL 2 TIMES DAILY
Qty: 20 TABLET | Refills: 0 | Status: SHIPPED | OUTPATIENT
Start: 2024-06-20 | End: 2024-06-30

## 2024-06-20 RX ORDER — FLUTICASONE FUROATE, UMECLIDINIUM BROMIDE AND VILANTEROL TRIFENATATE 100; 62.5; 25 UG/1; UG/1; UG/1
1 POWDER RESPIRATORY (INHALATION) DAILY
Qty: 60 EACH | Refills: 2 | Status: SHIPPED | OUTPATIENT
Start: 2024-06-20

## 2024-06-20 RX ORDER — IPRATROPIUM BROMIDE AND ALBUTEROL SULFATE 2.5; .5 MG/3ML; MG/3ML
1 SOLUTION RESPIRATORY (INHALATION) EVERY 6 HOURS PRN
Qty: 1620 ML | Refills: 1 | Status: SHIPPED | OUTPATIENT
Start: 2024-06-20

## 2024-06-20 RX ORDER — ALBUTEROL SULFATE 90 UG/1
2 AEROSOL, METERED RESPIRATORY (INHALATION) EVERY 6 HOURS PRN
Qty: 18 G | Refills: 3 | Status: SHIPPED | OUTPATIENT
Start: 2024-06-20

## 2024-06-20 ASSESSMENT — ASTHMA QUESTIONNAIRES
QUESTION_5 LAST FOUR WEEKS HOW WOULD YOU RATE YOUR ASTHMA CONTROL: WELL CONTROLLED
QUESTION_2 LAST FOUR WEEKS HOW OFTEN HAVE YOU HAD SHORTNESS OF BREATH: ONCE OR TWICE A WEEK
QUESTION_1 LAST FOUR WEEKS HOW MUCH OF THE TIME DID YOUR ASTHMA KEEP YOU FROM GETTING AS MUCH DONE AT WORK, SCHOOL OR AT HOME: NONE OF THE TIME
ACT_TOTALSCORE: 23
HOSPITALIZATION_OVERNIGHT_LAST_YEAR_TOTAL: ONE
QUESTION_3 LAST FOUR WEEKS HOW OFTEN DID YOUR ASTHMA SYMPTOMS (WHEEZING, COUGHING, SHORTNESS OF BREATH, CHEST TIGHTNESS OR PAIN) WAKE YOU UP AT NIGHT OR EARLIER THAN USUAL IN THE MORNING: NOT AT ALL
QUESTION_4 LAST FOUR WEEKS HOW OFTEN HAVE YOU USED YOUR RESCUE INHALER OR NEBULIZER MEDICATION (SUCH AS ALBUTEROL): NOT AT ALL
ACT_TOTALSCORE: 23

## 2024-06-20 ASSESSMENT — PAIN SCALES - GENERAL: PAINLEVEL: NO PAIN (0)

## 2024-06-20 NOTE — PROGRESS NOTES
Assessment & Plan     Inattention  - Adult Mental Health [facility] Referral; Future    Chronic obstructive pulmonary disease, unspecified COPD type (H)  Stable continue present management  - Fluticasone-Umeclidin-Vilant (TRELEGY ELLIPTA) 100-62.5-25 MCG/ACT oral inhaler; Inhale 1 puff into the lungs daily  - ipratropium - albuterol 0.5 mg/2.5 mg/3 mL (DUONEB) 0.5-2.5 (3) MG/3ML neb solution; Take 1 vial (3 mLs) by nebulization every 6 hours as needed for shortness of breath or wheezing    Moderate persistent asthma, unspecified whether complicated  - albuterol (PROAIR HFA/PROVENTIL HFA/VENTOLIN HFA) 108 (90 Base) MCG/ACT inhaler; Inhale 2 puffs into the lungs every 6 hours as needed for shortness of breath, wheezing or cough  - budesonide (PULMICORT) 0.5 MG/2ML neb solution; Take 2 mLs (0.5 mg) by nebulization 2 times daily    Rash and nonspecific skin eruption  - Herpes Simplex Virus 1 and 2 IgG; Future  - valACYclovir (VALTREX) 1000 mg tablet; Take 1 tablet (1,000 mg) by mouth 2 times daily for 10 days  - Herpes Simplex Virus 1 and 2 IgG            Subjective   Armaan is a 59 year old, presenting for the following health issues:  A.D.H.D (Would like referral), Derm Problem, and Breathing Problem      6/20/2024     2:35 PM   Additional Questions   Roomed by BETH     A.D.H.D    History of Present Illness       Reason for visit:   check up OTHER    He eats 4 or more servings of fruits and vegetables daily.He consumes 0 sweetened beverage(s) daily.He exercises with enough effort to increase his heart rate 60 or more minutes per day.  He exercises with enough effort to increase his heart rate 5 days per week. He is missing 1 dose(s) of medications per week.       ADHD  -wife recommends he gets tested would like referral        COPD Follow-Up  Overall, how are your COPD symptoms since your last clinic visit?  Better  How much fatigue or shortness of breath do you have when you are walking?  None  How much shortness  "of breath do you have when you are resting?  None  How often do you cough? All the time  Have you noticed any change in your sputum/phlegm?  No  Have you experienced a recent fever? No  Please describe how far you can walk without stopping to rest:  Greater than 2 miles  How many flights of stairs are you able to walk up without stopping?  3 or more  Have you had any Emergency Room Visits, Urgent Care Visits, or Hospital Admissions because of your COPD since your last office visit?  Yes    How many times have you gone to the ED, Urgent Care, or been hospitalized for COPD since your last clinic visit?  1    History   Smoking Status    Former    Types: Cigarettes   Smokeless Tobacco    Never     No results found for: \"FEV1\", \"NQK7PNX\"    Skin Lesion/Moles  Onset/Duration: 30 years  Description  Location: back, right flank, groin  Color: brown  Border description: raised  Character: raised  Itching: no  Bleeding:  No  Intensity:  moderate  Progression of Symptoms:  same  Accompanying signs and symptoms:   Bleeding: No  Scaling: No  Excessive sun exposure/tanning: YES  Sunscreen used: YES  History:           Any previous history of skin cancer: No  Any family history of melanoma: No  Previous episodes of similar lesion: YES  Precipitating or alleviating factors: n/a  Therapies tried and outcome: none        Review of Systems  Constitutional, HEENT, cardiovascular, pulmonary, GI, , musculoskeletal, neuro, skin, endocrine and psych systems are negative, except as otherwise noted.      Objective    /80 (BP Location: Left arm, Patient Position: Chair, Cuff Size: Adult Regular)   Pulse 87   Temp 98  F (36.7  C) (Temporal)   Resp 20   Ht 1.683 m (5' 6.25\")   Wt 67.7 kg (149 lb 5 oz)   SpO2 97%   BMI 23.92 kg/m    Body mass index is 23.92 kg/m .  Physical Exam     GENERAL: alert and no distress  EYES: Eyes grossly normal to inspection, PERRL and conjunctivae and sclerae normal  HENT: ear canals and TM's normal, " nose and mouth without ulcers or lesions  NECK: no adenopathy, no asymmetry, masses, or scars  RESP: lungs clear to auscultation - no rales, rhonchi or wheezes  CV: regular rate and rhythm, normal S1 S2, no S3 or S4, no murmur, click or rub, no peripheral edema  ABDOMEN: soft, nontender, no hepatosplenomegaly, no masses and bowel sounds normal  RECTAL (male): Small ulcer moriah-rectal lesion, skin of right gluteus with possible abrasion from tongs or healing Vesicular region from HSV  MS: no gross musculoskeletal defects noted, no edema            Signed Electronically by: Vincent Gomez MD

## 2024-06-24 LAB
HSV1 IGG SERPL QL IA: <0.01 INDEX
HSV1 IGG SERPL QL IA: NORMAL
HSV2 IGG SERPL QL IA: 0.05 INDEX
HSV2 IGG SERPL QL IA: NORMAL

## 2024-06-27 ENCOUNTER — OFFICE VISIT (OUTPATIENT)
Dept: FAMILY MEDICINE | Facility: CLINIC | Age: 59
End: 2024-06-27
Payer: COMMERCIAL

## 2024-06-27 VITALS
TEMPERATURE: 97.9 F | DIASTOLIC BLOOD PRESSURE: 86 MMHG | RESPIRATION RATE: 18 BRPM | WEIGHT: 150 LBS | HEART RATE: 69 BPM | BODY MASS INDEX: 24.11 KG/M2 | HEIGHT: 66 IN | OXYGEN SATURATION: 98 % | SYSTOLIC BLOOD PRESSURE: 130 MMHG

## 2024-06-27 DIAGNOSIS — L30.9 DERMATITIS: ICD-10-CM

## 2024-06-27 DIAGNOSIS — L98.9 SKIN LESION: Primary | ICD-10-CM

## 2024-06-27 PROCEDURE — 88305 TISSUE EXAM BY PATHOLOGIST: CPT | Performed by: DERMATOLOGY

## 2024-06-27 PROCEDURE — 99213 OFFICE O/P EST LOW 20 MIN: CPT | Mod: 25 | Performed by: FAMILY MEDICINE

## 2024-06-27 PROCEDURE — 11102 TANGNTL BX SKIN SINGLE LES: CPT | Performed by: FAMILY MEDICINE

## 2024-06-27 PROCEDURE — 11103 TANGNTL BX SKIN EA SEP/ADDL: CPT | Performed by: FAMILY MEDICINE

## 2024-06-27 RX ORDER — CLOTRIMAZOLE 1 %
CREAM (GRAM) TOPICAL 2 TIMES DAILY
Qty: 85 G | Refills: 1 | Status: SHIPPED | OUTPATIENT
Start: 2024-06-27

## 2024-07-01 LAB
PATH REPORT.COMMENTS IMP SPEC: NORMAL
PATH REPORT.COMMENTS IMP SPEC: NORMAL
PATH REPORT.FINAL DX SPEC: NORMAL
PATH REPORT.GROSS SPEC: NORMAL
PATH REPORT.MICROSCOPIC SPEC OTHER STN: NORMAL
PATH REPORT.RELEVANT HX SPEC: NORMAL
PHOTO IMAGE: NORMAL

## 2024-07-17 NOTE — PROGRESS NOTES
Shave Biopsy Procedure Note      Pre-operative Diagnosis: Suspicious lesion      Post-operative Diagnosis: same      Locations:Skin of upper and lower back, skin of right lower quadrant      Anesthesia: local and 1% lidocaine with epi  without added sodium bicarbonate      Procedure Details   History of allergy to iodine: no      Patient informed of the risks (including bleeding and infection) and benefits of the   procedure and verbal and printed informed consent obtained.      The lesion and surrounding area were given a sterile prep using Betadine and draped in the usual sterile fashion. A scalpel was used to shave an area of skin approximately 0.1 cm by 0.1 cm lesions on 1 upper back lesion, 1 mid sacral skin lesion, .  Hemostasis achieved with monopolar cautery. Antibiotic ointment and a sterile dressing applied. The specimen was sent for pathologic examination. The patient tolerated the procedure well.      Condition:  Stable      Complications:  none.      Plan:  1. Instructed to keep the wound dry and covered for 24-48h and clean thereafter.  2. Warning signs of infection were reviewed.    3. Recommended that the patient use OTC acetaminophen, OTC ibuprofen as needed for pain.      Additional Notes:

## 2024-10-11 ENCOUNTER — PATIENT OUTREACH (OUTPATIENT)
Dept: CARE COORDINATION | Facility: CLINIC | Age: 59
End: 2024-10-11
Payer: COMMERCIAL

## 2024-10-25 ENCOUNTER — PATIENT OUTREACH (OUTPATIENT)
Dept: CARE COORDINATION | Facility: CLINIC | Age: 59
End: 2024-10-25
Payer: COMMERCIAL

## 2024-12-03 DIAGNOSIS — J44.9 CHRONIC OBSTRUCTIVE PULMONARY DISEASE, UNSPECIFIED COPD TYPE (H): ICD-10-CM

## 2024-12-04 ENCOUNTER — MYC MEDICAL ADVICE (OUTPATIENT)
Dept: FAMILY MEDICINE | Facility: CLINIC | Age: 59
End: 2024-12-04
Payer: COMMERCIAL

## 2024-12-04 RX ORDER — FLUTICASONE FUROATE, UMECLIDINIUM BROMIDE AND VILANTEROL TRIFENATATE 100; 62.5; 25 UG/1; UG/1; UG/1
1 POWDER RESPIRATORY (INHALATION) DAILY
Qty: 60 EACH | Refills: 0 | Status: SHIPPED | OUTPATIENT
Start: 2024-12-04

## 2024-12-04 NOTE — TELEPHONE ENCOUNTER
A 1x joe refill has been given. Patient is due for a follow-up appointment.    Please contact patient and assist in scheduling appointment.     Inform patient future refills will be declined without completing appointment for monitoring control, or making mutually agreed upon follow-up arrangements .       Thank you,    Dr. Ricardo

## 2024-12-13 ENCOUNTER — TELEPHONE (OUTPATIENT)
Dept: FAMILY MEDICINE | Facility: CLINIC | Age: 59
End: 2024-12-13
Payer: COMMERCIAL

## 2024-12-13 NOTE — TELEPHONE ENCOUNTER
A one-time refill of Trelegy Ellipta inhaler with no further refills was sent to [facility] by provider on 12/4 and communicated to patient via [employer] message.  Called and spoke to [facility], this was transferred to [facility] pharmacy in [place]. Spoke to pharmacist at [facility], patient picked up medication on 12/4 and it was only 1 box, was told patient states he was looking for 3 boxes of Trelegy Ellipta inhaler.     Attempted to call patient, no answer, voicemail box is full. Patient is due for follow-up visit prior to further refills.

## 2024-12-13 NOTE — TELEPHONE ENCOUNTER
FYI - Status Update    Who is Calling: patient    Update: pt states that when he went to  his rx they only gave him 1/3 of the rx and was told to contact his pcp. Please contact pt to discuss. Upset as he paid $1200 for it and wants the rest    Does caller want a call/response back: Yes     Could we send this information to you in Manifest or would you prefer to receive a phone call?:   Patient would prefer a phone call AND [employer] as he works swing shift he stated.  Okay to leave a detailed message?: Yes at Home number on file [phone] (home)

## 2025-01-12 ENCOUNTER — HEALTH MAINTENANCE LETTER (OUTPATIENT)
Age: 60
End: 2025-01-12

## 2025-07-17 ENCOUNTER — OFFICE VISIT (OUTPATIENT)
Dept: FAMILY MEDICINE | Facility: CLINIC | Age: 60
End: 2025-07-17
Payer: COMMERCIAL

## 2025-07-17 VITALS
SYSTOLIC BLOOD PRESSURE: 121 MMHG | BODY MASS INDEX: 24.13 KG/M2 | DIASTOLIC BLOOD PRESSURE: 87 MMHG | HEART RATE: 84 BPM | WEIGHT: 150.13 LBS | RESPIRATION RATE: 16 BRPM | HEIGHT: 66 IN | OXYGEN SATURATION: 99 % | TEMPERATURE: 97.6 F

## 2025-07-17 DIAGNOSIS — R53.83 FATIGUE, UNSPECIFIED TYPE: ICD-10-CM

## 2025-07-17 DIAGNOSIS — Z00.00 ROUTINE GENERAL MEDICAL EXAMINATION AT A HEALTH CARE FACILITY: Primary | ICD-10-CM

## 2025-07-17 DIAGNOSIS — Z12.5 SCREENING FOR PROSTATE CANCER: ICD-10-CM

## 2025-07-17 DIAGNOSIS — K21.9 GASTROESOPHAGEAL REFLUX DISEASE, UNSPECIFIED WHETHER ESOPHAGITIS PRESENT: ICD-10-CM

## 2025-07-17 DIAGNOSIS — J45.40 MODERATE PERSISTENT ASTHMA, UNSPECIFIED WHETHER COMPLICATED: ICD-10-CM

## 2025-07-17 DIAGNOSIS — Z13.1 SCREENING FOR DIABETES MELLITUS: ICD-10-CM

## 2025-07-17 DIAGNOSIS — E78.5 HYPERLIPIDEMIA LDL GOAL <100: ICD-10-CM

## 2025-07-17 DIAGNOSIS — K44.9 HIATAL HERNIA: ICD-10-CM

## 2025-07-17 PROBLEM — J44.9 CHRONIC OBSTRUCTIVE PULMONARY DISEASE, UNSPECIFIED COPD TYPE (H): Status: RESOLVED | Noted: 2024-06-20 | Resolved: 2025-07-17

## 2025-07-17 LAB
ALBUMIN SERPL BCG-MCNC: 4.5 G/DL (ref 3.5–5.2)
ALP SERPL-CCNC: 55 U/L (ref 40–150)
ALT SERPL W P-5'-P-CCNC: 23 U/L (ref 0–70)
ANION GAP SERPL CALCULATED.3IONS-SCNC: 12 MMOL/L (ref 7–15)
AST SERPL W P-5'-P-CCNC: 25 U/L (ref 0–45)
BASOPHILS # BLD AUTO: 0.1 10E3/UL (ref 0–0.2)
BASOPHILS NFR BLD AUTO: 2 %
BILIRUB SERPL-MCNC: 0.6 MG/DL
BUN SERPL-MCNC: 19.7 MG/DL (ref 8–23)
CALCIUM SERPL-MCNC: 9.6 MG/DL (ref 8.8–10.4)
CHLORIDE SERPL-SCNC: 102 MMOL/L (ref 98–107)
CHOLEST SERPL-MCNC: 239 MG/DL
CREAT SERPL-MCNC: 0.94 MG/DL (ref 0.67–1.17)
EGFRCR SERPLBLD CKD-EPI 2021: >90 ML/MIN/1.73M2
EOSINOPHIL # BLD AUTO: 0.6 10E3/UL (ref 0–0.7)
EOSINOPHIL NFR BLD AUTO: 10 %
ERYTHROCYTE [DISTWIDTH] IN BLOOD BY AUTOMATED COUNT: 11.5 % (ref 10–15)
EST. AVERAGE GLUCOSE BLD GHB EST-MCNC: 105 MG/DL
FASTING STATUS PATIENT QL REPORTED: YES
FASTING STATUS PATIENT QL REPORTED: YES
GLUCOSE SERPL-MCNC: 96 MG/DL (ref 70–99)
HBA1C MFR BLD: 5.3 % (ref 0–5.6)
HCO3 SERPL-SCNC: 26 MMOL/L (ref 22–29)
HCT VFR BLD AUTO: 47.9 % (ref 40–53)
HDLC SERPL-MCNC: 56 MG/DL
HGB BLD-MCNC: 16.1 G/DL (ref 13.3–17.7)
IMM GRANULOCYTES # BLD: 0 10E3/UL
IMM GRANULOCYTES NFR BLD: 0 %
LDLC SERPL CALC-MCNC: 168 MG/DL
LYMPHOCYTES # BLD AUTO: 1.3 10E3/UL (ref 0.8–5.3)
LYMPHOCYTES NFR BLD AUTO: 22 %
MCH RBC QN AUTO: 32.4 PG (ref 26.5–33)
MCHC RBC AUTO-ENTMCNC: 33.6 G/DL (ref 31.5–36.5)
MCV RBC AUTO: 96 FL (ref 78–100)
MONOCYTES # BLD AUTO: 0.7 10E3/UL (ref 0–1.3)
MONOCYTES NFR BLD AUTO: 11 %
NEUTROPHILS # BLD AUTO: 3.2 10E3/UL (ref 1.6–8.3)
NEUTROPHILS NFR BLD AUTO: 56 %
NONHDLC SERPL-MCNC: 183 MG/DL
PLATELET # BLD AUTO: 310 10E3/UL (ref 150–450)
POTASSIUM SERPL-SCNC: 4.5 MMOL/L (ref 3.4–5.3)
PROT SERPL-MCNC: 7.1 G/DL (ref 6.4–8.3)
PSA SERPL DL<=0.01 NG/ML-MCNC: 0.55 NG/ML (ref 0–3.5)
RBC # BLD AUTO: 4.97 10E6/UL (ref 4.4–5.9)
SODIUM SERPL-SCNC: 140 MMOL/L (ref 135–145)
TRIGL SERPL-MCNC: 74 MG/DL
TSH SERPL DL<=0.005 MIU/L-ACNC: 1.63 UIU/ML (ref 0.3–4.2)
WBC # BLD AUTO: 5.8 10E3/UL (ref 4–11)

## 2025-07-17 RX ORDER — ALBUTEROL SULFATE 90 UG/1
2 INHALANT RESPIRATORY (INHALATION) EVERY 6 HOURS PRN
Qty: 18 G | Refills: 3 | Status: SHIPPED | OUTPATIENT
Start: 2025-07-17

## 2025-07-17 SDOH — HEALTH STABILITY: PHYSICAL HEALTH: ON AVERAGE, HOW MANY DAYS PER WEEK DO YOU ENGAGE IN MODERATE TO STRENUOUS EXERCISE (LIKE A BRISK WALK)?: 7 DAYS

## 2025-07-17 SDOH — HEALTH STABILITY: PHYSICAL HEALTH: ON AVERAGE, HOW MANY MINUTES DO YOU ENGAGE IN EXERCISE AT THIS LEVEL?: 150+ MIN

## 2025-07-17 ASSESSMENT — SOCIAL DETERMINANTS OF HEALTH (SDOH): HOW OFTEN DO YOU GET TOGETHER WITH FRIENDS OR RELATIVES?: NEVER

## 2025-07-17 ASSESSMENT — PAIN SCALES - GENERAL: PAINLEVEL_OUTOF10: MILD PAIN (2)

## 2025-07-17 NOTE — PATIENT INSTRUCTIONS
We will send you lab results    Keep working on healthy diet/exercise     Schedule upper endoscopy          Patient Education   Preventive Care Advice   This is general advice given by our system to help you stay healthy. However, your care team may have specific advice just for you. Please talk to your care team about your preventive care needs.  Nutrition  Eat 5 or more servings of fruits and vegetables each day.  Try wheat bread, brown rice and whole grain pasta (instead of white bread, rice, and pasta).  Get enough calcium and vitamin D. Check the label on foods and aim for 100% of the RDA (recommended daily allowance).  Lifestyle  Exercise at least 150 minutes each week  (30 minutes a day, 5 days a week).  Do muscle strengthening activities 2 days a week. These help control your weight and prevent disease.  No smoking.  Wear sunscreen to prevent skin cancer.  Have a dental exam and cleaning every 6 months.  Yearly exams  See your health care team every year to talk about:  Any changes in your health.  Any medicines your care team has prescribed.  Preventive care, family planning, and ways to prevent chronic diseases.  Shots (vaccines)   HPV shots (up to age 26), if you've never had them before.  Hepatitis B shots (up to age 59), if you've never had them before.  COVID-19 shot: Get this shot when it's due.  Flu shot: Get a flu shot every year.  Tetanus shot: Get a tetanus shot every 10 years.  Pneumococcal, hepatitis A, and RSV shots: Ask your care team if you need these based on your risk.  Shingles shot (for age 50 and up)  General health tests  Diabetes screening:  Starting at age 35, Get screened for diabetes at least every 3 years.  If you are younger than age 35, ask your care team if you should be screened for diabetes.  Cholesterol test: At age 39, start having a cholesterol test every 5 years, or more often if advised.  Bone density scan (DEXA): At age 50, ask your care team if you should have this scan  for osteoporosis (brittle bones).  Hepatitis C: Get tested at least once in your life.  STIs (sexually transmitted infections)  Before age 24: Ask your care team if you should be screened for STIs.  After age 24: Get screened for STIs if you're at risk. You are at risk for STIs (including HIV) if:  You are sexually active with more than one person.  You don't use condoms every time.  You or a partner was diagnosed with a sexually transmitted infection.  If you are at risk for HIV, ask about PrEP medicine to prevent HIV.  Get tested for HIV at least once in your life, whether you are at risk for HIV or not.  Cancer screening tests  Cervical cancer screening: If you have a cervix, begin getting regular cervical cancer screening tests starting at age 21.  Breast cancer scan (mammogram): If you've ever had breasts, begin having regular mammograms starting at age 40. This is a scan to check for breast cancer.  Colon cancer screening: It is important to start screening for colon cancer at age 45.  Have a colonoscopy test every 10 years (or more often if you're at risk) Or, ask your provider about stool tests like a FIT test every year or Cologuard test every 3 years.  To learn more about your testing options, visit:   .  For help making a decision, visit:   https://bit.ly/eb65285.  Prostate cancer screening test: If you have a prostate, ask your care team if a prostate cancer screening test (PSA) at age 55 is right for you.  Lung cancer screening: If you are a current or former smoker ages 50 to 80, ask your care team if ongoing lung cancer screenings are right for you.  For informational purposes only. Not to replace the advice of your health care provider. Copyright   2023 [employer]. All rights reserved. Clinically reviewed by the [facility] Transitions Program. [employer] [id number] - REV 01/24.  Relationships for Good Health  Relationships are important for our health and happiness. Social isolation,  loneliness and lack of support are bad for your health. Studies show that loneliness can harm health and limit your life span as much as high blood pressure and smoking.   Take some time to reflect on your relationships. Then answer these questions:  Are there people in your life that cause you stress or drain your energy? What can you do to set limits?  ________________________________________________________________________________________________________________________________________________________________________________________________________________________________________________________________________________________________________________________________________________  Who do you enjoy spending time with? Who can you go to for support?  ________________________________________________________________________________________________________________________________________________________________________________________________________________________________________________________________________________________________________________________________________________  What can you do to improve your relationships with others?  __________________________________________________________________________________________________________________________________________________________________________________________________________________  ______________________________________________________________________________________________________________________________  What do you like most about your relationships with others?  ________________________________________________________________________________________________________________________________________________________________________________________________________________________________________________________________________________________________________________________________________________  My goal:  ______________________________________________________________________  I will: ______________________________________________________________________________________________________________________________________________________________________________________________    For informational purposes only. Not to replace the advice of your health care provider. Copyright   2018 [facility]. All rights reserved. Clinically reviewed by [facility]  Team. Cecily [id number] - Rev 06/24.

## 2025-07-17 NOTE — PROGRESS NOTES
Preventive Care Visit  [facility]  Robbie Celaya MD, Family Medicine  Jul 17, 2025      Assessment & Plan     (Z00.00) Routine general medical examination at a health care facility  (primary encounter diagnosis)  Comment: patient in stable health overall    Plan: keep working on healthy diet/exercise     (K21.9) Gastroesophageal reflux disease, unspecified whether esophagitis present  Comment: needs a new egd; patient to schedule   Plan: Adult GI  Referral - Procedure Only             (K44.9) Hiatal hernia  Comment: as above   Plan: Adult GI  Referral - Procedure Only             (J45.40) Moderate persistent asthma, unspecified whether complicated  Comment: refill this.   Patient has some of the other inhalers at home and declined refills.   Plan: albuterol (PROAIR HFA/PROVENTIL HFA/VENTOLIN         HFA) 108 (90 Base) MCG/ACT inhaler             (Z12.5) Screening for prostate cancer  Comment: psa   Plan: Prostate Specific Antigen Screen             (Z13.1) Screening for diabetes mellitus  Comment: check   Plan: Hemoglobin A1c             (E78.5) Hyperlipidemia LDL goal <100  Comment: check   Plan: Lipid panel reflex to direct LDL Non-fasting,         Comprehensive metabolic panel             (R53.83) Fatigue, unspecified type  Comment: check   Plan: CBC with Platelets & Differential, TSH with         free T4 reflex           Patient declined std tests     Counseling  Appropriate preventive services were addressed with this patient via screening, questionnaire, or discussion as appropriate for fall prevention, nutrition, physical activity, Tobacco-use cessation, social engagement, weight loss and cognition.  Checklist reviewing preventive services available has been given to the patient.  Reviewed patient's diet, addressing concerns and/or questions.   Patient is at risk for social isolation and has been provided with information about the benefit of social connection.    The patient was instructed to see the dentist every 6 months.   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        7/17/2025   General Health   How would you rate your overall physical health? (!) FAIR   Feel stress (tense, anxious, or unable to sleep) Only a little   (!) STRESS CONCERN      7/17/2025   Nutrition   Three or more servings of calcium each day? (!) NO   Diet: Other   If other, please elaborate: dont mix sugars and proteins carnivore   How many servings of fruit and vegetables per day? (!) 0-1   How many sweetened beverages each day? 0-1         7/17/2025   Exercise   Days per week of moderate/strenous exercise 7 days   Average minutes spent exercising at this level 150+ min         7/17/2025   Social Factors   Frequency of gathering with friends or relatives Never   Worry food won't last until get money to buy more No   Food not last or not have enough money for food? No   Do you have housing? (Housing is defined as stable permanent housing and does not include staying outside in a car, in a tent, in an abandoned building, in an overnight shelter, or couch-surfing.) Yes   Are you worried about losing your housing? No   Lack of transportation? No   Unable to get utilities (heat,electricity)? No   (!) SOCIAL CONNECTIONS CONCERN      7/17/2025   Fall Risk   Fallen 2 or more times in the past year? No   Trouble with walking or balance? No          7/17/2025    Dental   Dentist two times every year? (!) NO           Today's PHQ-2 Score:       2025     2:36 PM   PHQ-2 (  [employer])   Q1: Little interest or pleasure in doing things 0   Q2: Feeling down, depressed or hopeless 0   PHQ-2 Score 0    Q1: Little interest or pleasure in doing things Not at all   Q2: Feeling down, depressed or hopeless Not at all   PHQ-2 Score 0       Patient-reported         2025   Substance Use   Alcohol more than 3/day or more than 7/wk No   Do you use any other substances recreationally? (!) DECLINE     Social History     Tobacco Use    Smoking status: Former     Current packs/day: 0.00     Average packs/day: 0.1 packs/day for 4.7 years (0.2 ttl pk-yrs)     Types: Cigarettes     Start date:      Quit date: 2022     Years since quittin.8     Passive exposure: Never    Smokeless tobacco: Never    Tobacco comments:     Per patient has only been a social smoker in the past but not a continuous smoker    Vaping Use    Vaping status: Never Used   Substance Use Topics    Alcohol use: Yes     Alcohol/week: 3.0 standard drinks of alcohol     Types: 3 Glasses of wine per week     Comment: 3 drinks per week    Drug use: Never           2025   STI Screening   New sexual partner(s) since last STI/HIV test? No   Last PSA:   Prostate Specific Antigen Screen   Date Value Ref Range Status   11/10/2023 1.61 0.00 - 3.50 ng/mL Final   2022 0.60 0.00 - 4.00 ug/L Final     ASCVD Risk   The 10-year ASCVD risk score (Jeffy ADRIAN, et al., 2019) is: 6.9%    Values used to calculate the score:      Age: 60 years      Sex: Male      Is Non- : No      Diabetic: No      Tobacco smoker: No      Systolic Blood Pressure: 121 mmHg      Is BP treated: No      HDL Cholesterol: 72 mg/dL      Total Cholesterol: 254 mg/dL           Reviewed and updated as needed this visit by Provider                        Objective    Exam  /87 (BP Location: Left arm, Patient  "Position: Chair, Cuff Size: Adult Regular)   Pulse 84   Temp 97.6  F (36.4  C) (Temporal)   Resp 16   Ht 1.683 m (5' 6.25\")   Wt 68.1 kg (150 lb 2 oz)   SpO2 99%   BMI 24.05 kg/m     Estimated body mass index is 24.05 kg/m  as calculated from the following:    Height as of this encounter: 1.683 m (5' 6.25\").    Weight as of this encounter: 68.1 kg (150 lb 2 oz).    Physical Exam  GENERAL: alert and no distress  EYES: Eyes grossly normal to inspection, PERRL and conjunctivae and sclerae normal  HENT: ear canals and TM's normal, nose and mouth without ulcers or lesions  NECK: no adenopathy, no asymmetry, masses, or scars  RESP: lungs clear to auscultation - no rales, rhonchi or wheezes  CV: regular rate and rhythm, normal S1 S2, no S3 or S4, no murmur, click or rub, no peripheral edema  ABDOMEN: soft, nontender, no hepatosplenomegaly, no masses and bowel sounds normal  MS: no gross musculoskeletal defects noted, no edema  SKIN: no suspicious lesions or rashes  NEURO: Normal strength and tone, mentation intact and speech normal  PSYCH: mentation appears normal, affect normal/bright        Signed Electronically by: Robbie Celaya MD    "

## 2025-07-22 ENCOUNTER — TRANSFERRED RECORDS (OUTPATIENT)
Dept: ADMINISTRATIVE | Facility: CLINIC | Age: 60
End: 2025-07-22
Payer: COMMERCIAL

## 2025-07-24 NOTE — PROCEDURES
[facility]   [address], [address], [place] [address]     Patient Name: Armaan Agustin  Gender:  Male  Exam Date: 07/22/2025 Visit Number:  [id number]  Age: 60 Years YOB: 1965  Attending MD: Nieves Tanner MD Medical Record#:  [id number]  -----------------------------------------------------------------------------------------------------------------------------   Procedure:    Upper GI Endoscopy   Indications:    GERD   Heartburn  Provider:        Nieves Tanner MD   Referring MD: Robbie Celaya MD   Primary MD:      Donald FaheyAhrndt MD  Medications:   Admitting Medication:   0.9% Normal Saline at [facility]   Intra Procedure Medications:   Patient received monitored anesthesia care.     Complications: No immediate complications  ___________________________________________________________________________________________  Procedure:   An examination of the heart and lungs was performed within acceptable limits.  . The patient was therefore deemed a reasonable candidate for sedation.   The risks and benefits were explained to the patient, who appeared to understand. After obtaining informed consent, the scope was passed under direct vision. Throughout the procedure the patient's blood pressure, pulse and oxygen saturations were monitored.  The scope was introduced through the mouth and advanced to the third portion of duodenum.         Findings:   Esophagus:    Irregular at z line   Location: distal esophagus;  The z-line is 42 centimeters from the incisors.  Top of the gastric folds is 42 centimeters from the incisors.  *Esophagus Comments:  Suggestive of GERD.  Multiple biopsy was taken from distal esophagus for pathology.  Stomach:    H. Pylori biopsies taken.   The diaphragm hiatus is at 42 centimeters from the incisors.     Gastritis.  Location - antrum. Description - erythema. H. Pylori biopsies taken.  Duodenum:    Normal duodenum.    Celiac Sprue biopsies  taken.  Celiac Sprue biopsies taken.  Impression:   GERD without esophagitis  Gastritis without bleeding, unspecified chronicity, unspecified gastritis type  MD Impression Comments:  Patient's oxygen saturation was down to 84% during the procedure.  It requires two anesthesia providers in the room for chin lifting/air way protection.     Preliminary Plan:  Recommendation Comments:  Await path.  Avoid NSAIDs use.   Follow up in [facility] clinic for further discussion/next step management.  Pathology Results:  A: DUODENUM, BIOPSY:           1. Normal duodenal mucosa           2. Negative for celiac disease and other enteropathy      B: STOMACH, BIOPSY:           1. Normal gastric antral and body mucosae           2. Negative for Helicobacter      C: ESOPHAGUS, DISTAL, BIOPSY:           1. Normal esophageal squamous mucosa           2. Gastric cardio-oxyntic type mucosa with no diagnostic abnormalities           3. Negative for reflux changes and eosinophilic esophagitis           4. Negative for intestinal metaplasia and dysplasia      MICROSCOPIC  A: Performed   B: Performed   C: Performed     Electronically signed by: Chico Banuelos MD    Interpreted at [facility], [address] [place] [id number]    Orders    Instruction(s)/Education:  Instruction/Education Timeframe Assessment   Gastritis  K21.9   Gastritis  K21.9   Gastroesophageal Reflux Disease  K21.9   Gastroesophageal Reflux Disease  K21.9     Follow-up visit/Referral:  Order Comments   follow-up visit First Available or by 07/22/2025      If your health care provider has asked for a follow-up visit, your appointment will be scheduled with a nurse practitioner or physician assistant on your provider's care team, unless noted above.        _Electronically signed by:___________________  Nieves Tanner MD                 07/22/2025    cc: Donald FaheyAhrndt MD  cc: Robbie Celaya MD